# Patient Record
Sex: FEMALE | Race: BLACK OR AFRICAN AMERICAN | NOT HISPANIC OR LATINO | Employment: FULL TIME | URBAN - METROPOLITAN AREA
[De-identification: names, ages, dates, MRNs, and addresses within clinical notes are randomized per-mention and may not be internally consistent; named-entity substitution may affect disease eponyms.]

---

## 2017-02-21 ENCOUNTER — TELEPHONE (OUTPATIENT)
Dept: OBGYN | Facility: CLINIC | Age: 22
End: 2017-02-21

## 2017-02-22 NOTE — TELEPHONE ENCOUNTER
Patient wants to scheduled her new prenatal visit. She is 16 weeks and is hoping she doesn't have to do any ob education/intake like some of our other clinics. She wants to get in as soon as possible.    Marita URENA  Central Scheduler

## 2017-03-02 ENCOUNTER — PRENATAL OFFICE VISIT (OUTPATIENT)
Dept: OBGYN | Facility: CLINIC | Age: 22
End: 2017-03-02
Payer: COMMERCIAL

## 2017-03-02 VITALS
WEIGHT: 231 LBS | DIASTOLIC BLOOD PRESSURE: 52 MMHG | SYSTOLIC BLOOD PRESSURE: 98 MMHG | HEIGHT: 66 IN | BODY MASS INDEX: 37.12 KG/M2

## 2017-03-02 DIAGNOSIS — F32.A DEPRESSION AFFECTING PREGNANCY, ANTEPARTUM: ICD-10-CM

## 2017-03-02 DIAGNOSIS — Z34.82 ENCOUNTER FOR SUPERVISION OF OTHER NORMAL PREGNANCY IN SECOND TRIMESTER: Primary | ICD-10-CM

## 2017-03-02 DIAGNOSIS — O99.340 DEPRESSION AFFECTING PREGNANCY, ANTEPARTUM: ICD-10-CM

## 2017-03-02 DIAGNOSIS — O20.8 SUBCHORIONIC HEMORRHAGE IN FIRST TRIMESTER: ICD-10-CM

## 2017-03-02 PROBLEM — Z34.80 SUPERVISION OF NORMAL IUP (INTRAUTERINE PREGNANCY) IN MULTIGRAVIDA: Status: ACTIVE | Noted: 2017-03-02

## 2017-03-02 LAB
ABO + RH BLD: NORMAL
ABO + RH BLD: NORMAL
ALBUMIN UR-MCNC: NEGATIVE MG/DL
APPEARANCE UR: CLEAR
BILIRUB UR QL STRIP: NEGATIVE
BLD GP AB SCN SERPL QL: NORMAL
BLOOD BANK CMNT PATIENT-IMP: NORMAL
COLOR UR AUTO: YELLOW
GLUCOSE UR STRIP-MCNC: NEGATIVE MG/DL
HGB UR QL STRIP: NEGATIVE
KETONES UR STRIP-MCNC: ABNORMAL MG/DL
LEUKOCYTE ESTERASE UR QL STRIP: NEGATIVE
MUCOUS THREADS #/AREA URNS LPF: PRESENT /LPF
NITRATE UR QL: NEGATIVE
NON-SQ EPI CELLS #/AREA URNS LPF: ABNORMAL /LPF
PH UR STRIP: 7.5 PH (ref 5–7)
RBC #/AREA URNS AUTO: ABNORMAL /HPF (ref 0–2)
SP GR UR STRIP: 1.02 (ref 1–1.03)
SPECIMEN EXP DATE BLD: NORMAL
URN SPEC COLLECT METH UR: ABNORMAL
UROBILINOGEN UR STRIP-ACNC: 0.2 EU/DL (ref 0.2–1)
WBC #/AREA URNS AUTO: ABNORMAL /HPF (ref 0–2)

## 2017-03-02 PROCEDURE — 87086 URINE CULTURE/COLONY COUNT: CPT | Performed by: OBSTETRICS & GYNECOLOGY

## 2017-03-02 PROCEDURE — 86850 RBC ANTIBODY SCREEN: CPT | Performed by: OBSTETRICS & GYNECOLOGY

## 2017-03-02 PROCEDURE — 81001 URINALYSIS AUTO W/SCOPE: CPT | Performed by: OBSTETRICS & GYNECOLOGY

## 2017-03-02 PROCEDURE — 87389 HIV-1 AG W/HIV-1&-2 AB AG IA: CPT | Performed by: OBSTETRICS & GYNECOLOGY

## 2017-03-02 PROCEDURE — 86762 RUBELLA ANTIBODY: CPT | Performed by: OBSTETRICS & GYNECOLOGY

## 2017-03-02 PROCEDURE — 87340 HEPATITIS B SURFACE AG IA: CPT | Performed by: OBSTETRICS & GYNECOLOGY

## 2017-03-02 PROCEDURE — 99214 OFFICE O/P EST MOD 30 MIN: CPT | Performed by: OBSTETRICS & GYNECOLOGY

## 2017-03-02 PROCEDURE — 86780 TREPONEMA PALLIDUM: CPT | Performed by: OBSTETRICS & GYNECOLOGY

## 2017-03-02 PROCEDURE — 86901 BLOOD TYPING SEROLOGIC RH(D): CPT | Performed by: OBSTETRICS & GYNECOLOGY

## 2017-03-02 PROCEDURE — 36415 COLL VENOUS BLD VENIPUNCTURE: CPT | Performed by: OBSTETRICS & GYNECOLOGY

## 2017-03-02 PROCEDURE — 87491 CHLMYD TRACH DNA AMP PROBE: CPT | Performed by: OBSTETRICS & GYNECOLOGY

## 2017-03-02 PROCEDURE — 87591 N.GONORRHOEAE DNA AMP PROB: CPT | Performed by: OBSTETRICS & GYNECOLOGY

## 2017-03-02 PROCEDURE — 86900 BLOOD TYPING SEROLOGIC ABO: CPT | Performed by: OBSTETRICS & GYNECOLOGY

## 2017-03-02 PROCEDURE — G0145 SCR C/V CYTO,THINLAYER,RESCR: HCPCS | Performed by: OBSTETRICS & GYNECOLOGY

## 2017-03-02 ASSESSMENT — ANXIETY QUESTIONNAIRES
3. WORRYING TOO MUCH ABOUT DIFFERENT THINGS: NEARLY EVERY DAY
GAD7 TOTAL SCORE: 12
5. BEING SO RESTLESS THAT IT IS HARD TO SIT STILL: NOT AT ALL
1. FEELING NERVOUS, ANXIOUS, OR ON EDGE: SEVERAL DAYS
6. BECOMING EASILY ANNOYED OR IRRITABLE: NEARLY EVERY DAY
IF YOU CHECKED OFF ANY PROBLEMS ON THIS QUESTIONNAIRE, HOW DIFFICULT HAVE THESE PROBLEMS MADE IT FOR YOU TO DO YOUR WORK, TAKE CARE OF THINGS AT HOME, OR GET ALONG WITH OTHER PEOPLE: SOMEWHAT DIFFICULT
2. NOT BEING ABLE TO STOP OR CONTROL WORRYING: NEARLY EVERY DAY
7. FEELING AFRAID AS IF SOMETHING AWFUL MIGHT HAPPEN: NOT AT ALL

## 2017-03-02 ASSESSMENT — PATIENT HEALTH QUESTIONNAIRE - PHQ9: 5. POOR APPETITE OR OVEREATING: MORE THAN HALF THE DAYS

## 2017-03-02 NOTE — PROGRESS NOTES
This is a 21 year old female patient,   who presents for her first obstetrical visit.    No LMP recorded (lmp unknown). Patient is pregnant. Her cycles are irregular.  Her last menstrual period was normal.    She has had an ultrasound on 2016 which showed viable IUP measuring 8w4d.  Based off that ultrasound today she is 17w5d pregnant. This gives her an EDC of Aug 5, 2017 .    Since her LMP, she has experienced  nausea, emesis, abdominal pain, fatigue, headache and loss of appetite, urinary incontience, depression and spots in eyes.).  She denies vaginal discharge, dysuria, pelvic pain, urinary urgency, lightheadedness, urinary frequency, vaginal bleeding, hemorrhoids and constipation.    Pregnancy unplanned. Hx mirena IUD placement  after her EAB. Did have f/u to verify placement per pt. Has never had regular periods. When had initial US for early pregnancy, no IUD seen. Did have abd XR which did not show IUD.  Was just  in Nov.  is masters student at the . Will be graduating soon. She has family in area, mom helps watch kids while she is at work. 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   First two kids are from different father    Has hx depression/anxiety, no meds. Did have PP depression, worked with therapist, Dr. Franklin, at the  after her second child. Has been meaning to go back, but doesn't have much time to go, issues with  when not working.    17 US at 15+ weeks, wnl, resolved NATALIE.     Past History:  Her past medical history   Past Medical History   Diagnosis Date     Chlamydia 2014     Chronic kidney disease      kidney stone      Depressive disorder      during previous pregnancy/ situational, postpartum it was diagnosed with second baby.     Gonorrhea  2014   .      Her past pregnancies have been uncomplicated.    Since her last LMP she admits to the use of alcohol but none since she found out is pregnant.    HISTORY:  Obstetric History       T2       TAB0   SAB0   E0   M0   L1       # Outcome Date GA Lbr Fredrick/2nd Weight Sex Delivery Anes PTL Lv   4 Current            3 AB 2016 9w0d          2 Term 12/15/15 40w1d    Vag-Spont      1 Term 14 39w6d 05:15 / 02:27 8 lb 4 oz (3.742 kg) M Vag-Spont EPI N Y      Name: TYRONE LONG ALBERTINA      Apgar1:  9                Apgar5: 9        Past Medical History   Diagnosis Date     Chlamydia 2014     Chronic kidney disease      kidney stone      Depressive disorder      during previous pregnancy/ situational, postpartum it was diagnosed with second baby.     Gonorrhea  2014     Past Surgical History   Procedure Laterality Date     No history of surgery       Family History   Problem Relation Age of Onset     DIABETES Maternal Grandmother      Type II doabetis     Social History     Social History     Marital status: Single     Spouse name: N/A     Number of children: 1     Years of education: N/A     Occupational History      Student     Social History Main Topics     Smoking status: Never Smoker     Smokeless tobacco: Never Used     Alcohol use Yes     Drug use: No      Comment: Pt. had smoked marijuna occassionally in the past, stopped when she found out she was pregnant     Sexual activity: No      Comment: Pt. is currently pregnant     Other Topics Concern     None     Social History Narrative     Current Outpatient Prescriptions   Medication Sig     IBUPROFEN PO      Acetaminophen (TYLENOL PO)      No current facility-administered medications for this visit.      Allergies   Allergen Reactions     Banana      anaphyaxis       Seasonal Allergies        Past medical, surgical, social and family history were reviewed and updated in EPIC.    ROS:   12 point review of systems negative other than symptoms noted below.  Constitutional: Fatigue and Loss of Appetite  Eyes: Spots  Gastrointestinal: Abdominal Pain, Nausea and Vomiting  Genitourinary: Incontinence  Neurologic: Headaches  Psychiatric:  "Depression    EXAM:  BP 98/52 (BP Location: Right arm, Patient Position: Chair, Cuff Size: Adult Regular)  Ht 5' 5.6\" (1.666 m)  Wt 231 lb (104.8 kg)  LMP  (LMP Unknown)  BMI 37.74 kg/m2   BMI: Body mass index is 37.74 kg/(m^2).    EXAM:  Constitutional:  Appearance: Well nourished, well developed alert, in no acute distress.  Neck:   Lymph Nodes:  No lymphadenopathy present.    Thyroid:  Gland size normal, nontender, no nodules or masses present  on palpation.  Chest:  Respiratory Effort:  Breathing unlabored.  Cardiovascular:  Heart Auscultation:  Regular rate, normal rhythm, no murmurs    present.  Breasts: Deferred.    Axillary Lymph Nodes:  No lymphadenopathy present.  Gastrointestinal:  Abdominal Examination:  Abdomen nontender to palpation, tone  normal without rigidity or guarding, no masses present, umbilicus without  Lesions.    Liver and speen:  No hepatomegaly present, liver nontender to  palpation.    Hernias:  No hernias present.  Lymphatic: Lymph Nodes:  No other lymphadenopathy present.  Skin:  General Inspection:  No rashes present, no lesions present, no areas of  discoloration.    Genitalia and Groin:  No rashes present, no lesions present, no areas of  discoloration, no masses present.  Neurologic/Psychiatric:    Mental Status:  Oriented X3.    Pelvic Exam:  External Genitalia:     Normal appearance for age, no discharge present, no tenderness present, no inflammatory lesions present, color normal  Vagina:     Normal vaginal vault without central or paravaginal defects, no discharge present, no inflammatory lesions present, no masses present  Bladder:     Nontender to palpation  Urethra:   Urethral Body:  Urethra palpation normal, urethra structural support normal   Urethral Meatus:  No erythema or lesions present  Cervix:     Appearance healthy, no lesions present, nontender to palpation, no bleeding present  Uterus:     Nontender to palpation, no masses present, position anteflexed, mobility: " normal  Adnexa:     No adnexal tenderness present, no adnexal masses present  Perineum:     Perineum within normal limits, no evidence of trauma, no rashes or skin lesions present  Anus:     Anus within normal limits, no hemorrhoids present  Inguinal Lymph Nodes:     No lymphadenopathy present  Pubic Hair:     Normal pubic hair distribution for age  Genitalia and Groin:     No rashes present, no lesions present, no areas of discoloration, no masses present    ASSESSMENT:      ICD-10-CM    1. Encounter for supervision of other normal pregnancy in second trimester Z34.82 ABO/Rh type and screen     Anti Treponema     Chlamydia trachomatis PCR     Hepatitis B surface antigen     HIV Antigen Antibody Combo     Neisseria gonorrhoeae PCR     Rubella Antibody IgG Quantitative     UA with Microscopic     Urine Culture Aerobic Bacterial     Pap imaged thin layer screen only - recommended age 21 - 24 years     US OB > 14 Weeks Complete Single     Social Work IP Consult   2. Depression affecting pregnancy, antepartum O99.340 ABO/Rh type and screen    F32.9 Anti Treponema     Chlamydia trachomatis PCR     Hepatitis B surface antigen     HIV Antigen Antibody Combo     Neisseria gonorrhoeae PCR     Rubella Antibody IgG Quantitative     UA with Microscopic     Urine Culture Aerobic Bacterial     Pap imaged thin layer screen only - recommended age 21 - 24 years     US OB > 14 Weeks Complete Single     Social Work IP Consult       PLAN/PATIENT INSTRUCTIONS:      -Pap obtained for cervical cancer screening.  -NOB labs today, orders reviewed  -Declined aneuploidy screening. Discussed options for diagnostic and screening tests, benefits and limitations of each.   -Previous IUD 4/16 found out subsequently she was pregnant, unable to see IUD on any US thus far. No issues otherwise. Suspect fell out. Will consider pelvic Xray PP to verify it is not intraabdominal.  -Depression/anxiety. Has a therapist she can see, strongly recommended she  make an appt. Declined services through [facility]. No active plans for suicide.   -Start PNV  -Will get SW consult.   -miscarriage precautions reviewed. Return for anatomy US 2wk.         Amaya Mauro Masters, DO          HPI      ROS      Physical Exam

## 2017-03-02 NOTE — MR AVS SNAPSHOT
After Visit Summary   3/2/2017    Jazlyn Ann    MRN: [id number]           Patient Information     Date Of Birth          1995        Visit Information        Provider Department      3/2/2017 1:30 PM Masters, Amaya Mauro, DO; [facility]        Today's Diagnoses     Encounter for supervision of other normal pregnancy in second trimester    -  1    Depression affecting pregnancy, antepartum        Subchorionic hemorrhage in first trimester           Follow-ups after your visit        Additional Services     Social Work IP Consult                 Follow-up notes from your care team     Return in about 2 weeks (around 3/16/2017).      Your next 10 appointments already scheduled     Mar 16, 2017  9:00 AM CDT   US PELVIC COMPLETE W TRANSVAGINAL with WEUS1   [facility] ([facility])    [address] [place] [phone]           Please bring a list of your medicines (including vitamins, minerals and over-the-counter drugs). Also, tell your doctor about any allergies you may have. Wear comfortable clothes and leave your valuables at home.  Adults: Drink six 8-ounce glasses of fluid one hour before your exam. Do NOT empty your bladder.  If you need to empty your bladder before your exam, try to release only a little bit of urine. Then, drink another 8oz glass of fluid.  Children: Children who are potty trained should drink at least 4 cups (32 oz) of liquid 45 minutes to one hour prior to the exam. The child s bladder must be full in order to achieve a diagnostic exam. If your child is very uncomfortable or has an urgent need to pee, please notify a technologist; they will try to find out how much longer the wait may be and provide instructions to help relieve the pressure. Occasionally it is medically necessary to insert a urinary catheter to fill the bladder.  Please call the Imaging  "Department at your exam site with any questions.            Mar 16, 2017  9:40 AM CDT   ESTABLISHED PRENATAL with Amaya Mauro Masters, DO   [facility] ([facility])    [address] [place] [address] [phone]              Who to contact     If you have questions or need follow up information about today's clinic visit or your schedule please contact [facility] directly at [phone].  Normal or non-critical lab and imaging results will be communicated to you by [employer], letter or phone within 4 business days after the clinic has received the results. If you do not hear from us within 7 days, please contact the clinic through [employer] or phone. If you have a critical or abnormal lab result, we will notify you by phone as soon as possible.  Submit refill requests through [employer] or call your pharmacy and they will forward the refill request to us. Please allow 3 business days for your refill to be completed.          Additional Information About Your Visit        [employer] Information     [employer] lets you send messages to your doctor, view your test results, renew your prescriptions, schedule appointments and more. To sign up, go to [email or web address] . Click on \"Log in\" on the left side of the screen, which will take you to the Welcome page. Then click on \"Sign up Now\" on the right side of the page.     You will be asked to enter the access code listed below, as well as some personal information. Please follow the directions to create your username and password.     Your access code is: [email or web address]  Expires: 3/28/2017  8:32 AM     Your access code will  in 90 days. If you need help or a new code, please call your [facility] clinic or [phone].        Care EveryWhere ID     This is your Care EveryWhere ID. This could be used by other organizations to access your [facility] medical records  [id number]        Your " "Vitals Were     Height Last Period BMI (Body Mass Index)             5' 5.6\" (1.666 m) (LMP Unknown) 37.74 kg/m2          Blood Pressure from Last 3 Encounters:   03/02/17 98/52   12/28/16 (!) 111/92   08/11/16 128/78    Weight from Last 3 Encounters:   03/02/17 231 lb (104.8 kg)   12/28/16 210 lb (95.3 kg)   08/11/16 230 lb (104.3 kg)              We Performed the Following     ABO/Rh type and screen     Anti Treponema     Chlamydia trachomatis PCR     Hepatitis B surface antigen     HIV Antigen Antibody Combo     Neisseria gonorrhoeae PCR     Pap imaged thin layer screen only - recommended age 21 - 24 years     Rubella Antibody IgG Quantitative     Social Work IP Consult     UA with Microscopic     Urine Culture Aerobic Bacterial        Primary Care Provider Office Phone # Fax #    [facility] [phone]       [address] [place] [id number]        Thank you!     Thank you for choosing [facility] [place]  for your care. Our goal is always to provide you with excellent care. Hearing back from our patients is one way we can continue to improve our services. Please take a few minutes to complete the written survey that you may receive in the mail after your visit with us. Thank you!             Your Updated Medication List - Protect others around you: Learn how to safely use, store and throw away your medicines at www.disposemymeds.org.          This list is accurate as of: 3/2/17 11:59 PM.  Always use your most recent med list.                   Brand Name Dispense Instructions for use    IBUPROFEN PO          TYLENOL PO            "

## 2017-03-02 NOTE — NURSING NOTE
[facility] Obstetrical Risk History    Patient presents for new OB labs and teaching.      1. Please indicate any condition you have or have had in the past:  Chlamydia, Depression  2. Do you smoke?  No  If yes, how many packs/day?   3. Do you drink alcoholic beverages? Yes-stopped when found out is pregnant  If yes, how often?  What type?   4. List any medications taken since your last period: Tylenol, Ibuprofen  5. List any recreational drugs (cocaine, marijuana, etc. used since your last period:None    6. List any chemical or radiation exposure that you've encountered: None  7. Are you on a restricted diet? No  If yes, please describe:  Do you have any Yazdanism objections to any form of treatment? No    GENETIC SCREENING    These questions apply to you, the baby's father, or anyone in either family with:    1. Patient's age 35 or greater at delivery? No  2. East Timorese, Equatorial Guinean, Mediterranean ancestry? No  3. Neural Tube Defect (Meningomyelocele, Spina Bifida, or Anencephaly)? No  4. Gnosticism, Kazakh [place] or history of Jamey-Sachs disease? No  5. Down's Syndrome?   No  6. Hemophilia or clotting disorder? No  7. Muscular Dystrophy? No  8. Cystic Fibrosis? No  9. Von's Chorea? No  10. Mental Retardation? No  11. 3 or more miscarriages or a stillborn? No  12. Other inherited disease or chromosomal disorder? No  13. Have you or the baby's father had a child born with a birth defect? No  14. Did you or the baby's father have a birth defect yourselves? No    Do you have any other concerns about birth defects? No      -History of irregular periods.  -Pt has not had a flu shot this season.   -Checked box in PHQ-9 and marked of thoughts that would be better off dead. Denies a plan of harming herself.  -Thinks she has had a PAP smear but not sure. Order placed.  -Dr. Restrepo stated to place Social Work order. Order placed.  -Discussed Innatal (Verifi) genetic screening. Pt stated she had genetic screening with  both of her children.  -Denies complications with previous pregnancies. Both vaginal deliveries.

## 2017-03-03 LAB
BACTERIA SPEC CULT: NORMAL
C TRACH DNA SPEC QL NAA+PROBE: NORMAL
HBV SURFACE AG SERPL QL IA: NONREACTIVE
HIV 1+2 AB+HIV1 P24 AG SERPL QL IA: NORMAL
Lab: NORMAL
MICRO REPORT STATUS: NORMAL
N GONORRHOEA DNA SPEC QL NAA+PROBE: NORMAL
RUBV IGG SERPL IA-ACNC: 35 IU/ML
SPECIMEN SOURCE: NORMAL
T PALLIDUM IGG+IGM SER QL: NEGATIVE

## 2017-03-03 ASSESSMENT — ANXIETY QUESTIONNAIRES: GAD7 TOTAL SCORE: 12

## 2017-03-03 ASSESSMENT — PATIENT HEALTH QUESTIONNAIRE - PHQ9: SUM OF ALL RESPONSES TO PHQ QUESTIONS 1-9: 22

## 2017-03-05 PROBLEM — O20.8 SUBCHORIONIC HEMORRHAGE IN FIRST TRIMESTER: Status: ACTIVE | Noted: 2017-03-05

## 2017-03-06 LAB
COPATH REPORT: NORMAL
PAP: NORMAL

## 2017-03-10 ENCOUNTER — TELEPHONE (OUTPATIENT)
Dept: NURSING | Facility: CLINIC | Age: 22
End: 2017-03-10

## 2017-03-10 NOTE — TELEPHONE ENCOUNTER
Pt 18w6d. Pt was talking to CMA about results and stated she was having constant headaches, task was routed to triage.   LMTCB to discuss symptoms.

## 2017-03-14 ENCOUNTER — CARE COORDINATION (OUTPATIENT)
Dept: CARE COORDINATION | Facility: CLINIC | Age: 22
End: 2017-03-14

## 2017-03-14 NOTE — PROGRESS NOTES
Clinic Care Coordination Contact  OUTREACH    Referral Information:  Referral Source:  (OB)  Reason for Contact: Jazlyn learned of her pregnancy in the ED; has had 1 pre-karina visit. Jazlyn went silent after I introduced myself, sounded like she hung up so I redialed. Went directly to voice mail. SW introduced role of [facility]_SW services and invited Jazlyn to call back if she would like support or community resources. Wished her well.  Care Conference: No     Universal Utilization:   ED Visits in last year: 2  Hospital visits in last year: 0  Last PCP appointment: 17  Missed Appointments:  (0)  Concerns:  (Unplanned pregnancy; screen for depression)  Multiple Providers or Specialists: 1    Clinical Concerns:  Current Medical Concerns: Unplanned pregnancy    Current Behavioral Concerns: 0 (history of depression?)   Education Provided to patient: via voice mail      Clinical Pathway: None    Medication Management:  self     Functional Status:  Mobility Status: Independent  Equipment Currently Used at Home:  (none)  Transportation: unknown           Psychosocial:  Current living arrangement:: Not Assessed  Financial/Insurance: not discussed       Resources and Interventions:  Current Resources:  (na);  (na)  PAS Number:  (na)  Senior Linkage Line Referral Placed:  (na)  Advanced Care Plans/Directives on file:: No  Referrals Placed:  (na)     Frequency of Care Coordination:  (declined)  Upcoming appointment:  (none)     Plan: [facility]  Clinic Care Coordinator-  Clinics: [place], [place], [place]  E-Mail: [email or web address]  Telephone: [phone]

## 2017-03-16 ENCOUNTER — PRENATAL OFFICE VISIT (OUTPATIENT)
Dept: OBGYN | Facility: CLINIC | Age: 22
End: 2017-03-16
Attending: OBSTETRICS & GYNECOLOGY
Payer: COMMERCIAL

## 2017-03-16 ENCOUNTER — RADIANT APPOINTMENT (OUTPATIENT)
Dept: ULTRASOUND IMAGING | Facility: CLINIC | Age: 22
End: 2017-03-16
Attending: OBSTETRICS & GYNECOLOGY
Payer: COMMERCIAL

## 2017-03-16 VITALS — DIASTOLIC BLOOD PRESSURE: 70 MMHG | WEIGHT: 242 LBS | BODY MASS INDEX: 39.54 KG/M2 | SYSTOLIC BLOOD PRESSURE: 114 MMHG

## 2017-03-16 DIAGNOSIS — O99.340 DEPRESSION AFFECTING PREGNANCY, ANTEPARTUM: ICD-10-CM

## 2017-03-16 DIAGNOSIS — O99.340 DEPRESSION AFFECTING PREGNANCY, ANTEPARTUM: Primary | ICD-10-CM

## 2017-03-16 DIAGNOSIS — Z34.82 ENCOUNTER FOR SUPERVISION OF OTHER NORMAL PREGNANCY IN SECOND TRIMESTER: ICD-10-CM

## 2017-03-16 DIAGNOSIS — F32.A DEPRESSION AFFECTING PREGNANCY, ANTEPARTUM: Primary | ICD-10-CM

## 2017-03-16 DIAGNOSIS — Z34.82 SUPERVISION OF NORMAL IUP (INTRAUTERINE PREGNANCY) IN MULTIGRAVIDA, SECOND TRIMESTER: ICD-10-CM

## 2017-03-16 DIAGNOSIS — F32.A DEPRESSION AFFECTING PREGNANCY, ANTEPARTUM: ICD-10-CM

## 2017-03-16 PROCEDURE — 76805 OB US >/= 14 WKS SNGL FETUS: CPT | Performed by: OBSTETRICS & GYNECOLOGY

## 2017-03-16 PROCEDURE — 99212 OFFICE O/P EST SF 10 MIN: CPT | Performed by: OBSTETRICS & GYNECOLOGY

## 2017-03-16 NOTE — MR AVS SNAPSHOT
After Visit Summary   3/16/2017    Jazlyn Ann    MRN: [id number]           Patient Information     Date Of Birth          1995        Visit Information        Provider Department      3/16/2017 9:40 AM s, Amaya Mauro,  [facility]        Today's Diagnoses     Depression affecting pregnancy, antepartum    -  1    Supervision of normal IUP (intrauterine pregnancy) in multigravida, second trimester           Follow-ups after your visit        Additional Services     MENTAL HEALTH REFERRAL       Your provider has referred you to: Behavioral Healthcare Providers Intake Scheduling [phone]   [email or web address]    All scheduling is subject to the client's specific insurance plan & benefits, provider/location availability, and provider clinical specialities.  Please arrive 15 minutes early for your first appointment and bring your completed paperwork.    Please be aware that coverage of these services is subject to the terms and limitations of your health insurance plan.  Call member services at your health plan with any benefit or coverage questions.                  Follow-up notes from your care team     Return in about 2 weeks (around 3/30/2017).      Future tests that were ordered for you today     Open Future Orders        Priority Expected Expires Ordered    US OB Limited One Or More Fetuses Routine  3/17/2018 3/16/2017            Who to contact     If you have questions or need follow up information about today's clinic visit or your schedule please contact [facility] directly at [phone].  Normal or non-critical lab and imaging results will be communicated to you by MyChart, letter or phone within 4 business days after the clinic has received the results. If you do not hear from us within 7 days, please contact the clinic through MyChart or phone. If you have a critical or abnormal lab result, we will notify you by phone as soon  "as possible.  Submit refill requests through [employer] or call your pharmacy and they will forward the refill request to us. Please allow 3 business days for your refill to be completed.          Additional Information About Your Visit        [employer] Information     [employer] lets you send messages to your doctor, view your test results, renew your prescriptions, schedule appointments and more. To sign up, go to [email or web address] . Click on \"Log in\" on the left side of the screen, which will take you to the Welcome page. Then click on \"Sign up Now\" on the right side of the page.     You will be asked to enter the access code listed below, as well as some personal information. Please follow the directions to create your username and password.     Your access code is: [email or web address]  Expires: 3/28/2017  9:32 AM     Your access code will  in 90 days. If you need help or a new code, please call your [facility] clinic or [phone].        Care EveryWhere ID     This is your Care EveryWhere ID. This could be used by other organizations to access your [facility] medical records  [id number]        Your Vitals Were     Last Period BMI (Body Mass Index)                (LMP Unknown) 39.54 kg/m2           Blood Pressure from Last 3 Encounters:   17 114/70   17 98/52   16 (!) 111/92    Weight from Last 3 Encounters:   17 242 lb (109.8 kg)   17 231 lb (104.8 kg)   16 210 lb (95.3 kg)              We Performed the Following     MENTAL HEALTH REFERRAL        Primary Care Provider Office Phone # Fax #    [facility] [phone]       [address] [place] [id number]        Thank you!     Thank you for choosing [facility]  for your care. Our goal is always to provide you with excellent care. Hearing back from our patients is one way we can continue to improve our services. Please take a few minutes to complete the written survey that " you may receive in the mail after your visit with us. Thank you!             Your Updated Medication List - Protect others around you: Learn how to safely use, store and throw away your medicines at www.disposemymeds.org.          This list is accurate as of: 3/16/17 10:10 AM.  Always use your most recent med list.                   Brand Name Dispense Instructions for use    IBUPROFEN PO      Reported on 3/16/2017       TYLENOL PO

## 2017-03-16 NOTE — PROGRESS NOTES
No loss of fluid/vaginal bleeding/regular contractions. +FM  Wondering about her wt gain.   -Discussed nutrition and exercise, limiting wt gain.   -Reviewed anatomy US. KATHRYN 9.4, however, two pockets >2cm. Will repeat KATHRYN in 1-2wk  -Has not f/u with mental health yet. Plans to call to make appt.   - Labor precautions. F/U 2wk.     Amaya Mauro Masters, DO

## 2017-04-18 ENCOUNTER — CARE COORDINATION (OUTPATIENT)
Dept: CARE COORDINATION | Facility: CLINIC | Age: 22
End: 2017-04-18

## 2017-04-18 NOTE — LETTER
Health Care Home - Access Care Plan    About Me  Patient Name:  Jazlyn Ann    YOB: 1995  Age:                            21 year old   Kina MRN:         [id number] Telephone Information:     Home Phone [phone]   Mobile [phone]       Address:    [address] [place] [address] Email address:  No e-mail address on record      Emergency Contact(s)  Name Relationship Lgl Grd Work Phone Home Phone Mobile Phone   1. ANA MARIA MCHUGH* Mother  none [phone]   2. NO SEC CONTACT Other   [phone]              Health Maintenance: Routine Health maintenance Reviewed: Due/Overdue     My Access Plan  Medical Emergency 911   Questions or concerns during clinic hours Primary Clinic Line, I will call the clinic directly: Primary Clinic:  (uses several)   24 Hour Appointment Line [phone] or  [phone])  (toll free)   24 Hour Nurse Line [phone] (toll free)   Questions or concerns outside clinic hours 24 Hour Appointment Line, I will call the after-hours on-call line:   [facility] [phone] or [phone]) (toll-free)   Preferred Urgent Care Preferred Urgent Care: Other   Preferred Hospital Preferred Hospital:  ([facility])   Preferred Pharmacy [facility] [facility] [place], [place] - [address] Behavioral Health Crisis Line Crisis Connection, [phone] or [phone]     My Care Team Members  Patient Care Team       Relationship Specialty Notifications Start End    Clinic, [facility] PCP - General   12/10/15     Phone: [phone] Fax: [phone]         [address] [place] [address]    Ting Davies LSW  Clinic Admissions 4/18/17     Comment:  Care Coordination [facility]    Phone: [phone] Fax: [phone]            Suzy Franklin PsyD., LP Psychologist COUNSELOR - PROFESSIONAL  4/18/17     Comment:  [facility]    Phone: [phone]         [address]; [place] [address]    [facility] Obstetrician Obstetrics & Gynecology, Maternal & Fetal Medicine  4/18/17     Phone: [phone] [address] [place] [address]        My Medical and Care Information  Problem List   Patient Active Problem List   Diagnosis     Fall     Supervision of normal IUP (intrauterine pregnancy) in multigravida     Subchorionic hemorrhage in first trimester      Current Medications and Allergies:  See printed Medication Report

## 2017-04-18 NOTE — PROGRESS NOTES
Clinic Care Coordination Contact  OUTREACH    Referral Information:  Referral Source:  (OB)  Reason for Contact: Jazlyn is needing to schedule a counseling appt for untreated depression/risk of PPD.  Care Conference: No     Universal Utilization:   ED Visits in last year: 2  Hospital visits in last year: 0  Last PCP appointment: 03/16/17  Missed Appointments:  (0)  Concerns:  (Unplanned pregnancy; screen for depression)  Multiple Providers or Specialists: 1    Clinical Concerns:  Current Medical Concerns: 6 months pregnant     Current Behavioral Concerns: 0    Education Provided to patient: Role of CC SW   Clinical Pathway Name: None  Clinical Pathway: None    Medication Management:  self     Functional Status:  Mobility Status: Independent  Equipment Currently Used at Home:  (none)  Transportation: self           Psychosocial:  Current living arrangement:: I live in a private home with spouse and children. My 's mother is coming from [place] to be present for birth and stay to help at home. My mom lives local, is involved.  Financial/Insurance: Medicjyoti GLASER at this time       Resources and Interventions:  Jazlyn agrees it would be helpful to have monthly support call from CC    Frequency of Care Coordination:  (As needed)  Upcoming appointment:  (none) Jazlyn stated she will call Suzy Franklin to schedule a counseling appt.     Plan: SW out reach 1 time per month through this pregnancy.    Mily Davies [facility]  Clinic Care Coordinator-  Clinics: [place], [place], [place]  E-Mail: [email or web address]  Telephone: [phone]

## 2017-04-19 NOTE — PROGRESS NOTES
The patient has not been seen for obstetrical care in one month, repeat evaluation was recommended for 1-2 weeks from the last. Has no f/u visits scheduled.   Please encourage pt to make an appointment for ultrasound and f/u visit.    Amaya Oconnors, DO

## 2017-04-26 ENCOUNTER — CARE COORDINATION (OUTPATIENT)
Dept: CARE COORDINATION | Facility: CLINIC | Age: 22
End: 2017-04-26

## 2017-04-26 NOTE — PROGRESS NOTES
Clinic Care Coordination Contact  OUTREACH    Referral Information:  Referral Source:  (OB)  Reason for Contact: Follow up per OB: Pt needs OB appt and a follow up US  Care Conference: No     Universal Utilization:   ED Visits in last year: 2  Hospital visits in last year: 0  Last PCP appointment: 03/16/17  Missed Appointments:  (0)  Concerns:  (Unplanned pregnancy; screen for depression)  Multiple Providers or Specialists: 1    Clinical Concerns:  Current Medical Concerns: Pregnancy with questionable US results    Current Behavioral Concerns: 0    Education Provided to patient: Importance of her appts   Clinical Pathway Name: None  Clinical Pathway: None    Medication Management:  self     Functional Status:  Mobility Status: Independent  Equipment Currently Used at Home:  (none)  Transportation: self           Psychosocial:  Current living arrangement:: I live in a private home with spouse  Financial/Insurance: has a new job; 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       Resources and Interventions:  Current Resources:  (na);  (na)  PAS Number:  (na)  Senior Linkage Line Referral Placed:  (na)  Advanced Care Plans/Directives on file:: No  Referrals Placed:  (na)     Goals:   Goal 1 Statement:  (I will call scheduling for OB appt and US follow up)  Goal 1 Progression Percent: 20%  Goal 1 Progression Date: 04/26/17    Barriers: tired chasing after 2 young sons and now working 2nd to 3rd shifts  Strengths: sounded engaged in conversation today  Patient/Caregiver understanding: good, could state OB told her there are 2 fluid areas on 1st US they would like to have checked out.  Frequency of Care Coordination:  (As needed)  Upcoming appointment:  Jazlyn has committed to caling scheduling line as soon as she finished our conversation today.     Plan: SW following for progress on goals/offer support.    Mily Davies [facility]  Clinic Care Coordinator-  Clinics: [place], [place], [place]  E-Mail:  [email or web address]  Telephone: [phone]

## 2017-04-28 ENCOUNTER — RADIANT APPOINTMENT (OUTPATIENT)
Dept: ULTRASOUND IMAGING | Facility: CLINIC | Age: 22
End: 2017-04-28
Attending: OBSTETRICS & GYNECOLOGY
Payer: COMMERCIAL

## 2017-04-28 DIAGNOSIS — Z34.82 SUPERVISION OF NORMAL IUP (INTRAUTERINE PREGNANCY) IN MULTIGRAVIDA, SECOND TRIMESTER: ICD-10-CM

## 2017-04-28 PROCEDURE — 76815 OB US LIMITED FETUS(S): CPT | Performed by: OBSTETRICS & GYNECOLOGY

## 2017-05-30 ENCOUNTER — CARE COORDINATION (OUTPATIENT)
Dept: CARE COORDINATION | Facility: CLINIC | Age: 22
End: 2017-05-30

## 2017-05-30 NOTE — PROGRESS NOTES
Clinic Care Coordination Contact  [facility]/Voicemail    Referral Source:  (OB)  Clinical Data: Care Coordinator Outreach  Outreach attempted x 2.  Left message on voicemail with call back information and requested return call.  Plan: Care Coordinator is following to encourage Jazlyn to schedule OB appts. SW needs to verify if [facility] is primary or if [facility] is.. Care Coordinator will try to reach patient again in 8-10 business days.    Mily Davies [facility]  Clinic Care Coordinator-  Clinics: [place], [facility], [place]  E-Mail: [email or web address]  Telephone: [phone]

## 2017-06-19 ENCOUNTER — CARE COORDINATION (OUTPATIENT)
Dept: CARE COORDINATION | Facility: CLINIC | Age: 22
End: 2017-06-19

## 2017-06-19 NOTE — LETTER
Health Care Home - Access Care Plan    About Me  Patient Name:  Jazlyn Ann    YOB: 1995  Age:                            21 year old   Kina MRN:         [id number] Telephone Information:     Home Phone [phone]   Mobile [phone]       Address:    [address] [facility] [address]  [facility] [address] Email address:  No e-mail address on record      Emergency Contact(s)  Name Relationship Lgl Grd Work Phone Home Phone Mobile Phone   1. ANA MARIA MCHUGH* Mother  none [phone]   2. NO SEC CONTACT Other   [phone]              Health Maintenance: Routine Health maintenance Reviewed: Up to date    My Access Plan  Medical Emergency 911   Questions or concerns during clinic hours Primary Clinic Line, I will call the clinic directly: Primary Clinic:  (uses several)   24 Hour Appointment Line [phone] or  [phone])  (toll free)   24 Hour Nurse Line [phone] (toll free)   Questions or concerns outside clinic hours 24 Hour Appointment Line, I will call the after-hours on-call line:   [facility] [phone] or [phone]) (toll-free)   Preferred Urgent Care Preferred Urgent Care: Other   Preferred Hospital Preferred Hospital:  ([facility])   Preferred Pharmacy [facility] [place], [place] - [address] Behavioral Health Crisis Line Crisis Connection, [phone] or [phone]     My Care Team Members  Patient Care Team       Relationship Specialty Notifications Start End    [facility] PCP - General   12/10/15     Phone: [phone] Fax: [phone]         [address] [place] [address]    Ting Davies LSW  Clinic Admissions 4/18/17     Comment:  Care Coordination [facility]    Phone: [phone] Fax: [phone]         [place] [facility] [address] [place] [address]    Suzy Franklin PsyD. ADRIEL Psychologist COUNSELOR - PROFESSIONAL  4/18/17     Comment:  [facility]  [facility]    Phone: [phone]         [address] [place]; [place] [address]    [facility] Obstetrician Obstetrics & Gynecology, Maternal & Fetal Medicine  4/18/17     Phone: [phone] [address] [place] [address]    Masters, Amaya Mauro DO MD OB/Gyn  6/19/17     Phone: [phone] Fax: [phone]         [facility] [address] [place] [address]        My Medical and Care Information  Problem List   Patient Active Problem List   Diagnosis     Fall     Supervision of normal IUP (intrauterine pregnancy) in multigravida     Subchorionic hemorrhage in first trimester      Current Medications and Allergies:  See printed Medication Report

## 2017-06-19 NOTE — LETTER
[place] CARE COORDINATION  [facility] [address]  Phone: [phone]      June 19, 2017      Jazlyn Ann  [address]  [place] [address]    Dear Jazlyn,    It was nice talking to you today. Enclosed are the services [facility] offers that I reviewed in general on our call. This will give you more detailed information on our various services.    Schedule a Same-Day Appointment: Most [facility] offer same-day appointments and extended hours Monday-Friday. Call [facility] (toll-free), 24/7, to schedule an appointment.    We have Urgent Care: Choose [facility] when you need non-emergency care for a sore throat, ear infection, sinus infection, minor burn, minor cut, bladder/urinary tract infection (UTI), or other general illnesses and discomforts. Locations are [place] (Weekends only), [place], [place], [place], [place], [place] and [facility] and you can see the wait times by going online to [email or web address] under urgent care   [email or web address]    [facility] offers Express Care within the [facility]. Choose Express Care when you have a certain non-emergency illness or injury. Express Cares offer flu, strep and UTI tests (but don't have on-site laboratories or X-rays). Express Cares have set prices but also accept several major insurance plans. [email or web address]    Online - OnCare is an online diagnosis and treatment option for minor health conditions. You'll answer questions about your symptoms in a 5-minute online survey (no web camera needed). Then a [facility] provider will respond in less than an hour from 8 a.m. to 8 p.m., 7 days a week. Signing up is FREE and each visit only costs $25.   [email or web address]     Phone -can set up a phone visit with their primary care provider. Contact your clinic or schedule a visit through [facility] .    Email - Current  [facility] patients can use [facility]  messaging (similar to email) for diagnosis and treatment. [email or web address]     E-visits: through [facility]opo  email functionality, you share your medical concerns or symptoms. Your health-care provider will respond to you within 24 hours (except weekends) of receiving your message if it is a question that they feel can be managed without seeing you in person. Cost is $35 We will bill your insurance company for this service. You ll be responsible for the full cost if insurance coverage is denied.   [email or web address]   I hope you find this information useful - We value our patients at [facility] and want to help you be able to access care that is both convenient and affordable for you and your family.    Sincerely,        Ting Davies    ENC: Baby resources and 24 Hour Access Plan.

## 2017-06-19 NOTE — PROGRESS NOTES
Clinic Care Coordination Contact  OUTREACH    Referral Information:  Referral Source:  (OB)  Reason for Contact: Jazlyn returned SW call. She need Dr Oconnor's telephone number; also would appreciate SW mailing her some mother/baby resources.        Universal Utilization:   ED Visits in last year: 2  Hospital visits in last year: 0  Last PCP appointment: 03/16/17  Missed Appointments:  (0)  Concerns:  (Unplanned pregnancy; screen for depression)  Multiple Providers or Specialists: 1    Clinical Concerns:  Current Medical Concerns: expecting a bay in August 2017    Current Behavioral Concerns: 0    Education Provided to patient: role of CC   Clinical Pathway Name: None  Clinical Pathway: None    Medication Management:  self     Functional Status:  Mobility Status: Independent  Equipment Currently Used at Home:  (none)  Transportation: no concerns           Psychosocial:  Current living arrangement:: I live in a private home with spouse  Financial/Insurance: [place] Care product?       Resources and Interventions:  Current Resources:  (na);  (OB new baby resources)  PAS Number:  (na)  Senior Linkage Line Referral Placed:  (na)  Advanced Care Plans/Directives on file:: No  Referrals Placed:  (na)     Goals:    NA              Barriers: NA  Strengths: NA  Patient/Caregiver understanding: good  Frequency of Care Coordination:  (As needed)  Upcoming appointment:  (none)     Plan: SW out reach for support; SW to complete a full psychosocial when Jazlyn has time. AMAN will mail baby resources to Jazlyn at [address] [facility] [address].    Mily Davies [facility]  Clinic Care Coordinator-  Clinics: [place], [place], [place]  E-Mail: [email or web address]  Telephone: [phone]

## 2017-06-26 ENCOUNTER — PRENATAL OFFICE VISIT (OUTPATIENT)
Dept: OBGYN | Facility: CLINIC | Age: 22
End: 2017-06-26
Payer: MEDICAID

## 2017-06-26 VITALS — SYSTOLIC BLOOD PRESSURE: 104 MMHG | WEIGHT: 241 LBS | BODY MASS INDEX: 39.37 KG/M2 | DIASTOLIC BLOOD PRESSURE: 60 MMHG

## 2017-06-26 DIAGNOSIS — Z34.83 SUPERVISION OF NORMAL IUP (INTRAUTERINE PREGNANCY) IN MULTIGRAVIDA, THIRD TRIMESTER: ICD-10-CM

## 2017-06-26 PROCEDURE — 99207 ZZC PRENATAL VISIT: CPT | Performed by: OBSTETRICS & GYNECOLOGY

## 2017-06-26 RX ORDER — PRENATAL VIT/IRON FUM/FOLIC AC 27MG-0.8MG
1 TABLET ORAL DAILY
COMMUNITY
End: 2017-06-27

## 2017-06-26 NOTE — PROGRESS NOTES
No loss of fluid/vaginal bleeding/regular contractions. + FM  Just finally got insurance.  Has not been able to see therapist due to insurance and working.   Had gotten IUD in April right after an . Had gone back for IUD check and it was present. Is thinking about getting her tubes tied.Will consider further  Is thinking about only taking 3wks for maternity leave.   -Recent US showed normal KATHRYN and breech. Cephalic today.  -Still needs to do glucola, showed up today having eaten right before arrival. Informed her to return this week.   -Tubal papers signed. Discussed she needs to be certain, today she does express some hesitation. I discussed her increased risk of regret due to her young age. Discussed IUDs or Nexplanon, though she naturally is hesitant about IUD as conceived on one.   -Mood appropriate. No red flags. Encouraged her to make appt with psych as soon as she can.   -Not planning on hosp tour  -GIven infomration on baby box.  - Labor precautions. F/U 1wk    Amaya Mauro Masters, DO

## 2017-06-26 NOTE — MR AVS SNAPSHOT
After Visit Summary   6/26/2017    Jazlyn Ann    MRN: [id number]           Patient Information     Date Of Birth          1995        Visit Information        Provider Department      6/26/2017 1:50 PM s, Amaya Mauro DO [facility] [place]        Today's Diagnoses     Supervision of normal IUP (intrauterine pregnancy) in multigravida, third trimester           Follow-ups after your visit        Your next 10 appointments already scheduled     Jun 27, 2017  9:30 AM CDT   Glucose Tolerance Test with [facility]   [facility] [place] ([facility] [place])    [address]  [place] [address]   [phone]            Jul 03, 2017 10:30 AM CDT   ESTABLISHED PRENATAL with Amaya Restrepo,    [facility] [place] ([facility] [place])    [address]  [place] [address]   [phone]              Future tests that were ordered for you today     Open Future Orders        Priority Expected Expires Ordered    OB hemoglobin Routine  7/7/2017 6/26/2017    Glucose tolerance gest screen 1 hour Routine  7/7/2017 6/26/2017            Who to contact     If you have questions or need follow up information about today's clinic visit or your schedule please contact [facility] [place] directly at [phone].  Normal or non-critical lab and imaging results will be communicated to you by MyChart, letter or phone within 4 business days after the clinic has received the results. If you do not hear from us within 7 days, please contact the clinic through MyChart or phone. If you have a critical or abnormal lab result, we will notify you by phone as soon as possible.  Submit refill requests through [facility] or call your pharmacy and they will forward the refill request to us. Please allow 3 business days for your refill to be completed.          Additional Information About Your Visit       "  MyChart Information     [address] lets you send messages to your doctor, view your test results, renew your prescriptions, schedule appointments and more. To sign up, go to [email or web address] . Click on \"Log in\" on the left side of the screen, which will take you to the Welcome page. Then click on \"Sign up Now\" on the right side of the page.     You will be asked to enter the access code listed below, as well as some personal information. Please follow the directions to create your username and password.     Your access code is: [email or web address]  Expires: 2017  2:45 PM     Your access code will  in 90 days. If you need help or a new code, please call your [facility] clinic or [phone].        Care EveryWhere ID     This is your Care EveryWhere ID. This could be used by other organizations to access your [facility] medical records  [id number]        Your Vitals Were     Last Period BMI (Body Mass Index)                (LMP Unknown) 39.37 kg/m2           Blood Pressure from Last 3 Encounters:   17 104/60   17 114/70   17 98/52    Weight from Last 3 Encounters:   17 241 lb (109.3 kg)   17 242 lb (109.8 kg)   17 231 lb (104.8 kg)              Today, you had the following     No orders found for display       Primary Care Provider Office Phone # Fax #    [facility] [phone]       [address] [place] [address]        Equal Access to Services     LINDA SOSA : Hadii clyde robertson Sojorge luis, waaxda luqadaha, qaybta kaalsusie kaur. So [facility] [phone].    ATENCIÓN: Si habla español, tiene a ferrell disposición servicios gratuitos de asistencia lingüística. Llame al [phone].    We comply with applicable federal civil rights laws and [place] laws. We do not discriminate on the basis of race, color, national origin, age, disability sex, sexual orientation or gender identity.          "   Thank you!     Thank you for choosing [facility] [place]  for your care. Our goal is always to provide you with excellent care. Hearing back from our patients is one way we can continue to improve our services. Please take a few minutes to complete the written survey that you may receive in the mail after your visit with us. Thank you!             Your Updated Medication List - Protect others around you: Learn how to safely use, store and throw away your medicines at www.disposemymeds.org.          This list is accurate as of: 6/26/17  2:45 PM.  Always use your most recent med list.                   Brand Name Dispense Instructions for use Diagnosis    prenatal multivitamin  plus iron 27-0.8 MG Tabs per tablet      Take 1 tablet by mouth daily        TYLENOL PO

## 2017-06-27 ENCOUNTER — TELEPHONE (OUTPATIENT)
Dept: OBGYN | Facility: CLINIC | Age: 22
End: 2017-06-27

## 2017-06-27 DIAGNOSIS — Z34.83 SUPERVISION OF NORMAL IUP (INTRAUTERINE PREGNANCY) IN MULTIGRAVIDA, THIRD TRIMESTER: ICD-10-CM

## 2017-06-27 DIAGNOSIS — Z34.83 SUPERVISION OF NORMAL INTRAUTERINE PREGNANCY IN MULTIGRAVIDA, THIRD TRIMESTER: Primary | ICD-10-CM

## 2017-06-27 LAB
GLUCOSE 1H P 50 G GLC PO SERPL-MCNC: 77 MG/DL (ref 60–129)
HGB BLD-MCNC: 10.4 G/DL (ref 11.7–15.7)

## 2017-06-27 PROCEDURE — 82950 GLUCOSE TEST: CPT | Performed by: OBSTETRICS & GYNECOLOGY

## 2017-06-27 PROCEDURE — 00000218 ZZHCL STATISTIC OBHBG - HEMOGLOBIN: Performed by: OBSTETRICS & GYNECOLOGY

## 2017-06-27 PROCEDURE — 36415 COLL VENOUS BLD VENIPUNCTURE: CPT | Performed by: OBSTETRICS & GYNECOLOGY

## 2017-06-27 NOTE — TELEPHONE ENCOUNTER
Prenatal Vitamin      Last Written Prescription Date:  NA  Last Fill Quantity: NA,   # refills: NA  Last Office Visit with [facility] primary care provider:  6/26/17  Future Office visit: 7/3/2017    Pt saw Lauro yesterday and she has not been taking PNV at all during this pregnancy and has asked if Lauro could prescribe it. She was in today for her glucose tolerance testing    Routing refill request to provider for review/approval because:  Medication is reported/historical

## 2017-06-29 NOTE — TELEPHONE ENCOUNTER
34w5d; JAQUI: 8/5/17  Routing to Dr. Restrepo, ok to fill? Noted as Historical, never prescribed by our clinic. Rx & pharmacy pended.

## 2017-06-30 ENCOUNTER — CARE COORDINATION (OUTPATIENT)
Dept: CARE COORDINATION | Facility: CLINIC | Age: 22
End: 2017-06-30

## 2017-06-30 RX ORDER — PRENATAL VIT/IRON FUM/FOLIC AC 27MG-0.8MG
1 TABLET ORAL DAILY
Qty: 100 TABLET | Refills: 11 | Status: SHIPPED | OUTPATIENT
Start: 2017-06-30 | End: 2019-06-11

## 2017-06-30 NOTE — TELEPHONE ENCOUNTER
Prescription given. Also inform her that PNV are available, the same kind, over the counter and she does not need to delay use for prescription.    Amaya Mauro Masters, DO

## 2017-06-30 NOTE — PROGRESS NOTES
Jazlyn followed through and scheduled her next OB appt for July 3. 2017.  Updated her goals.    Mily Davies [facility]  Clinic Care Coordinator-  Clinics: [place], [place], [place]  E-Mail: [email or web address]  Telephone: [phone]   Repeat CT head @24hr post-tPA shows no interval change, no hydrocephalus, midline shift, acute intracranial hemorrhage or demarcated territorial infarct. Pending MRI.

## 2017-07-10 ENCOUNTER — PRENATAL OFFICE VISIT (OUTPATIENT)
Dept: OBGYN | Facility: CLINIC | Age: 22
End: 2017-07-10
Payer: MEDICAID

## 2017-07-10 VITALS — WEIGHT: 245.8 LBS | SYSTOLIC BLOOD PRESSURE: 108 MMHG | BODY MASS INDEX: 40.16 KG/M2 | DIASTOLIC BLOOD PRESSURE: 66 MMHG

## 2017-07-10 DIAGNOSIS — Z36.9 ENCOUNTER FOR ANTENATAL SCREENING OF MOTHER: Primary | ICD-10-CM

## 2017-07-10 DIAGNOSIS — Z34.83 SUPERVISION OF NORMAL IUP (INTRAUTERINE PREGNANCY) IN MULTIGRAVIDA, THIRD TRIMESTER: ICD-10-CM

## 2017-07-10 PROCEDURE — 87186 SC STD MICRODIL/AGAR DIL: CPT | Performed by: OBSTETRICS & GYNECOLOGY

## 2017-07-10 PROCEDURE — 87653 STREP B DNA AMP PROBE: CPT | Performed by: OBSTETRICS & GYNECOLOGY

## 2017-07-10 PROCEDURE — 99207 ZZC PRENATAL VISIT: CPT | Performed by: OBSTETRICS & GYNECOLOGY

## 2017-07-10 NOTE — PROGRESS NOTES
No loss of fluid/vaginal bleeding/regular contractions. + FM  -brought in certificate for baby box.   -GBS testing today  - Labor precautions. F/U 1wk    Amaya Mauro Masters, DO

## 2017-07-10 NOTE — MR AVS SNAPSHOT
"              After Visit Summary   7/10/2017    Jazlyn Ann    MRN: [id number]           Patient Information     Date Of Birth          1995        Visit Information        Provider Department      7/10/2017 11:50 AM Amaya Restrepo DO [facility]        Today's Diagnoses     Encounter for  screening of mother    -  1    Supervision of normal IUP (intrauterine pregnancy) in multigravida, third trimester           Follow-ups after your visit        Your next 10 appointments already scheduled     2017 10:50 AM CDT   ESTABLISHED PRENATAL with Amaya Restrepo DO   [facility] [place] ([facility])    [address] [place] [address] [phone]              Who to contact     If you have questions or need follow up information about today's clinic visit or your schedule please contact [facility] [place] directly at [phone].  Normal or non-critical lab and imaging results will be communicated to you by Vaddiohart, letter or phone within 4 business days after the clinic has received the results. If you do not hear from us within 7 days, please contact the clinic through CloudWorkt or phone. If you have a critical or abnormal lab result, we will notify you by phone as soon as possible.  Submit refill requests through Maginatics or call your pharmacy and they will forward the refill request to us. Please allow 3 business days for your refill to be completed.          Additional Information About Your Visit        Vaddiohart Information     Maginatics lets you send messages to your doctor, view your test results, renew your prescriptions, schedule appointments and more. To sign up, go to [email or web address] . Click on \"Log in\" on the left side of the screen, which will take you to the Welcome page. Then click on \"Sign up Now\" on the right side of the page.     You will be asked to enter the access " code listed below, as well as some personal information. Please follow the directions to create your username and password.     Your access code is: [email or web address]  Expires: 2017  2:45 PM     Your access code will  in 90 days. If you need help or a new code, please call your [place] clinic or [phone].        Care EveryWhere ID     This is your Care EveryWhere ID. This could be used by other organizations to access your [place] medical records  [id number]        Your Vitals Were     Last Period BMI (Body Mass Index)                (LMP Unknown) 40.16 kg/m2           Blood Pressure from Last 3 Encounters:   07/10/17 108/66   17 104/60   17 114/70    Weight from Last 3 Encounters:   07/10/17 245 lb 12.8 oz (111.5 kg)   17 241 lb (109.3 kg)   17 242 lb (109.8 kg)              We Performed the Following     Strep, Group B by [facility]        Primary Care Provider Office Phone # Fax #    [facility] [phone]       [address] [place] [address]        Equal Access to Services     CASANDRA SOSA : Hadii clyde salinaso Zach, waaxda luqadaha, qaybta kaalmada adedavidyada, susie munoz. So [facility] [phone].    ATENCIÓN: Si habla español, tiene a ferrell disposición servicios gratuitos de asistencia lingüística. [facility] [phone].    We comply with applicable federal civil rights laws and [place] laws. We do not discriminate on the basis of race, color, national origin, age, disability sex, sexual orientation or gender identity.            Thank you!     Thank you for choosing [facility]  for your care. Our goal is always to provide you with excellent care. Hearing back from our patients is one way we can continue to improve our services. Please take a few minutes to complete the written survey that you may receive in the mail after your visit with us. Thank you!             Your Updated Medication  List - Protect others around you: Learn how to safely use, store and throw away your medicines at www.disposemymeds.org.          This list is accurate as of: 7/10/17 12:29 PM.  Always use your most recent med list.                   Brand Name Dispense Instructions for use Diagnosis    prenatal multivitamin  plus iron 27-0.8 MG Tabs per tablet     100 tablet    Take 1 tablet by mouth daily    Supervision of normal intrauterine pregnancy in multigravida, third trimester

## 2017-07-11 LAB
GP B STREP DNA SPEC QL NAA+PROBE: ABNORMAL
SPECIMEN SOURCE: ABNORMAL

## 2017-07-15 LAB
BACTERIA SPEC CULT: ABNORMAL
MICRO REPORT STATUS: ABNORMAL
MICROORGANISM SPEC CULT: ABNORMAL
SPECIMEN SOURCE: ABNORMAL

## 2017-07-17 ENCOUNTER — PRENATAL OFFICE VISIT (OUTPATIENT)
Dept: OBGYN | Facility: CLINIC | Age: 22
End: 2017-07-17
Payer: MEDICAID

## 2017-07-17 VITALS — SYSTOLIC BLOOD PRESSURE: 112 MMHG | DIASTOLIC BLOOD PRESSURE: 70 MMHG | BODY MASS INDEX: 40 KG/M2 | WEIGHT: 244.8 LBS

## 2017-07-17 DIAGNOSIS — B95.1 POSITIVE GBS TEST: Primary | ICD-10-CM

## 2017-07-17 DIAGNOSIS — Z34.83 SUPERVISION OF NORMAL IUP (INTRAUTERINE PREGNANCY) IN MULTIGRAVIDA, THIRD TRIMESTER: ICD-10-CM

## 2017-07-17 PROCEDURE — 99207 ZZC PRENATAL VISIT: CPT | Performed by: OBSTETRICS & GYNECOLOGY

## 2017-07-17 NOTE — MR AVS SNAPSHOT
After Visit Summary   7/17/2017    Jazlyn Ann    MRN: [id number]           Patient Information     Date Of Birth          1995        Visit Information        Provider Department      7/17/2017 10:50 AM Amaya Restrepo DO [facility] [place]        Today's Diagnoses     Positive GBS test    -  1    Supervision of normal IUP (intrauterine pregnancy) in multigravida, third trimester           Follow-ups after your visit        Follow-up notes from your care team     Return in about 1 week (around 7/24/2017).      Your next 10 appointments already scheduled     Jul 24, 2017 11:20 AM CDT   ESTABLISHED PRENATAL with Amaya Restrepo DO   [facility] [place] ([facility] [place])    [address]  [place] [address]   [phone]            Jul 31, 2017 10:50 AM CDT   ESTABLISHED PRENATAL with Amaya Restrepo DO   [facility] [place] ([facility])    [address]  [place] [address]   [phone]              Who to contact     If you have questions or need follow up information about today's clinic visit or your schedule please contact [facility] [place] directly at [phone].  Normal or non-critical lab and imaging results will be communicated to you by Lionsharp Voiceboardhart, letter or phone within 4 business days after the clinic has received the results. If you do not hear from us within 7 days, please contact the clinic through Lionsharp Voiceboardhart or phone. If you have a critical or abnormal lab result, we will notify you by phone as soon as possible.  Submit refill requests through Dubset Media or call your pharmacy and they will forward the refill request to us. Please allow 3 business days for your refill to be completed.          Additional Information About Your Visit        Dubset Media Information     Dubset Media lets you send messages to your doctor, view your test results,  "renew your prescriptions, schedule appointments and more. To sign up, go to [email or web address] . Click on \"Log in\" on the left side of the screen, which will take you to the Welcome page. Then click on \"Sign up Now\" on the right side of the page.     You will be asked to enter the access code listed below, as well as some personal information. Please follow the directions to create your username and password.     Your access code is: [email or web address]  Expires: 2017  2:45 PM     Your access code will  in 90 days. If you need help or a new code, please call your [place] clinic or [phone].        Care EveryWhere ID     This is your Care EveryWhere ID. This could be used by other organizations to access your [place] medical records  [id number]        Your Vitals Were     Last Period BMI (Body Mass Index)                (LMP Unknown) 40 kg/m2           Blood Pressure from Last 3 Encounters:   17 112/70   07/10/17 108/66   17 104/60    Weight from Last 3 Encounters:   17 244 lb 12.8 oz (111 kg)   07/10/17 245 lb 12.8 oz (111.5 kg)   17 241 lb (109.3 kg)              Today, you had the following     No orders found for display       Primary Care Provider Office Phone # Fax #    [facility] [phone]       [address] [place] [address]        Equal Access to Services     CASANDRA SOSA AH: Hadii clyde ku hadasho Soomaali, waaxda luqadaha, qaybta kaalmada adeegyada, susie munoz. So [facility] [phone].    ATENCIÓN: Si habla español, tiene a ferrell disposición servicios gratuitos de asistencia lingüística. Llame al [phone].    We comply with applicable federal civil rights laws and [place] laws. We do not discriminate on the basis of race, color, national origin, age, disability sex, sexual orientation or gender identity.            Thank you!     Thank you for choosing [facility] [place]  for your " care. Our goal is always to provide you with excellent care. Hearing back from our patients is one way we can continue to improve our services. Please take a few minutes to complete the written survey that you may receive in the mail after your visit with us. Thank you!             Your Updated Medication List - Protect others around you: Learn how to safely use, store and throw away your medicines at www.disposemymeds.org.          This list is accurate as of: 7/17/17 11:18 AM.  Always use your most recent med list.                   Brand Name Dispense Instructions for use Diagnosis    prenatal multivitamin  plus iron 27-0.8 MG Tabs per tablet     100 tablet    Take 1 tablet by mouth daily    Supervision of normal intrauterine pregnancy in multigravida, third trimester

## 2017-07-17 NOTE — PROGRESS NOTES
No loss of fluid/vaginal bleeding/regular contractions. + FM  -GBS Pos, tx in labor  -Discussed hemorrhoids and treatments, long term management.  -Plans to preregister at the hospital.  -Labor precautions. F/U 1wk.    Amaya Mauro Masters, DO

## 2017-07-21 ENCOUNTER — CARE COORDINATION (OUTPATIENT)
Dept: CARE COORDINATION | Facility: CLINIC | Age: 22
End: 2017-07-21

## 2017-07-21 NOTE — PROGRESS NOTES
Clinic Care Coordination Contact  OUTREACH    Referral Information:  Referral Source:  (OB)  Reason for Contact: follow up  Care Conference: No     Universal Utilization:   ED Visits in last year: 2  Hospital visits in last year: 0  Last PCP appointment: 03/16/17  Missed Appointments:  (0)  Concerns:  (Unplanned pregnancy; screen for depression)  Multiple Providers or Specialists: 1    Clinical Concerns:  Current Medical Concerns: Jazlyn has made several OB appts (labor precautions)    Current Behavioral Concerns: 0    Education Provided to patient: 0   Clinical Pathway Name: None  Clinical Pathway: None    Medication Management:  self     Functional Status:  Mobility Status: Independent  Equipment Currently Used at Home:  (none)  Transportation: self/others           Psychosocial:  Current living arrangement:: I live in a private home with spouse  Financial/Insurance: ok       Resources and Interventions:  Current Resources:  (na);  (OB new baby resources)  PAS Number:  (na)  Senior Linkage Line Referral Placed:  (na)  Advanced Care Plans/Directives on file:: No  Referrals Placed:  (na)     Goals:   Goal 1 Statement:  (I will schedule F/U OB appts)  Goal 1 Progression Percent: 90%  Goal 1 Progression Date: 07/21/17     Barriers: overcome and is making appts  Strengths: looking forward to birth of baby  Patient/Caregiver understanding: good  Frequency of Care Coordination:  (As needed)  Upcoming appointment: 07/03/17 (none)     Plan:  support on going.    Mily Davies [facility]  Clinic Care Coordinator-  Clinics: [place], [place], [place]  E-Mail: [email or web address]  Telephone: [phone]

## 2017-07-23 ENCOUNTER — NURSE TRIAGE (OUTPATIENT)
Dept: NURSING | Facility: CLINIC | Age: 22
End: 2017-07-23

## 2017-07-24 ENCOUNTER — PRENATAL OFFICE VISIT (OUTPATIENT)
Dept: OBGYN | Facility: CLINIC | Age: 22
End: 2017-07-24
Payer: MEDICAID

## 2017-07-24 VITALS — SYSTOLIC BLOOD PRESSURE: 110 MMHG | DIASTOLIC BLOOD PRESSURE: 62 MMHG | WEIGHT: 242 LBS | BODY MASS INDEX: 39.54 KG/M2

## 2017-07-24 DIAGNOSIS — Z34.83 SUPERVISION OF NORMAL IUP (INTRAUTERINE PREGNANCY) IN MULTIGRAVIDA, THIRD TRIMESTER: ICD-10-CM

## 2017-07-24 PROCEDURE — 99207 ZZC PRENATAL VISIT: CPT | Performed by: OBSTETRICS & GYNECOLOGY

## 2017-07-24 NOTE — MR AVS SNAPSHOT
"              After Visit Summary   7/24/2017    Jazlyn Ann    MRN: [id number]           Patient Information     Date Of Birth          1995        Visit Information        Provider Department      7/24/2017 11:20 AM Amaya Restrepo DO [facility]        Today's Diagnoses     Supervision of normal IUP (intrauterine pregnancy) in multigravida, third trimester           Follow-ups after your visit        Your next 10 appointments already scheduled     Jul 31, 2017 10:50 AM CDT   ESTABLISHED PRENATAL with Amaya Restrepo DO   [facility]    [address] [id number] [phone]              Who to contact     If you have questions or need follow up information about today's clinic visit or your schedule please contact [facility] [place] directly at [phone].  Normal or non-critical lab and imaging results will be communicated to you by MyChart, letter or phone within 4 business days after the clinic has received the results. If you do not hear from us within 7 days, please contact the clinic through Synergy Hubhart or phone. If you have a critical or abnormal lab result, we will notify you by phone as soon as possible.  Submit refill requests through Grimm Bros or call your pharmacy and they will forward the refill request to us. Please allow 3 business days for your refill to be completed.          Additional Information About Your Visit        Synergy Hubhart Information     Grimm Bros lets you send messages to your doctor, view your test results, renew your prescriptions, schedule appointments and more. To sign up, go to [email or web address] . Click on \"Log in\" on the left side of the screen, which will take you to the Welcome page. Then click on \"Sign up Now\" on the right side of the page.     You will be asked to enter the access code listed below, as well as some personal information. " Please follow the directions to create your username and password.     Your access code is: [email or web address]  Expires: 2017  2:45 PM     Your access code will  in 90 days. If you need help or a new code, please call your [place] clinic or [phone].        Care EveryWhere ID     This is your Care EveryWhere ID. This could be used by other organizations to access your [place] medical records  [id number]        Your Vitals Were     Last Period Breastfeeding? BMI (Body Mass Index)             (LMP Unknown) No 39.54 kg/m2          Blood Pressure from Last 3 Encounters:   17 110/62   17 112/70   07/10/17 108/66    Weight from Last 3 Encounters:   17 242 lb (109.8 kg)   17 244 lb 12.8 oz (111 kg)   07/10/17 245 lb 12.8 oz (111.5 kg)              Today, you had the following     No orders found for display       Primary Care Provider Office Phone # Fax #    [facility] [phone]       [address] [place] [address]        Equal Access to Services     CASANDRA SOSA AH: Hadii clyde mast hadasho Soyiali, waaxda luqadaha, qaybta kaalmada adeegyada, susie munoz. So [facility] [phone].    ATENCIÓN: Si habla español, tiene a ferrell disposición servicios gratuitos de asistencia lingüística. Llame al [phone].    We comply with applicable federal civil rights laws and [place] laws. We do not discriminate on the basis of race, color, national origin, age, disability sex, sexual orientation or gender identity.            Thank you!     Thank you for choosing [facility] [place]  for your care. Our goal is always to provide you with excellent care. Hearing back from our patients is one way we can continue to improve our services. Please take a few minutes to complete the written survey that you may receive in the mail after your visit with us. Thank you!             Your Updated Medication List - Protect others around  you: Learn how to safely use, store and throw away your medicines at www.disposemymeds.org.          This list is accurate as of: 7/24/17 12:13 PM.  Always use your most recent med list.                   Brand Name Dispense Instructions for use Diagnosis    prenatal multivitamin  plus iron 27-0.8 MG Tabs per tablet     100 tablet    Take 1 tablet by mouth daily    Supervision of normal intrauterine pregnancy in multigravida, third trimester

## 2017-07-24 NOTE — PROGRESS NOTES
No loss of fluid/vaginal bleeding/regular contractions. + FM  -SVE sl changed. Tirado 4. Discussed favorability/tirado scores, rules on IOL and gestational age and literature on timing IOL for fetal benefit. Will recheck next week, if favorable, will want IOL  -GBS pos, tx in labor.   -Discussed pain control options in labor, timing.   -Labor precautions. F/U 1wk. Discussed when to call clinic and if goes into labor or has emergency, to go to nearest hospital.    Amaya Mauro Masters, DO

## 2017-07-26 ENCOUNTER — HOSPITAL ENCOUNTER (OUTPATIENT)
Facility: CLINIC | Age: 22
Discharge: HOME | End: 2017-07-26
Attending: OBSTETRICS & GYNECOLOGY | Admitting: OBSTETRICS & GYNECOLOGY
Payer: MEDICAID

## 2017-07-26 ENCOUNTER — TELEPHONE (OUTPATIENT)
Dept: NURSING | Facility: CLINIC | Age: 22
End: 2017-07-26

## 2017-07-26 VITALS — SYSTOLIC BLOOD PRESSURE: 127 MMHG | DIASTOLIC BLOOD PRESSURE: 80 MMHG | TEMPERATURE: 97.5 F

## 2017-07-26 PROCEDURE — 99213 OFFICE O/P EST LOW 20 MIN: CPT | Mod: 25

## 2017-07-26 PROCEDURE — 40000809 ZZH STATISTIC NO DOCUMENTATION TO SUPPORT CHARGE

## 2017-07-26 PROCEDURE — 59025 FETAL NON-STRESS TEST: CPT

## 2017-07-26 PROCEDURE — 99213 OFFICE O/P EST LOW 20 MIN: CPT

## 2017-07-26 NOTE — IP AVS SNAPSHOT
MRN:[id number]                      After Visit Summary   7/26/2017    Jazlyn Ann    MRN: [id number]           Thank you!     Thank you for choosing [place] for your care. Our goal is always to provide you with excellent care. Hearing back from our patients is one way we can continue to improve our services. Please take a few minutes to complete the written survey that you may receive in the mail after you visit with us. Thank you!        Patient Information     Date Of Birth          1995        About your hospital stay     You were admitted on:  July 26, 2017 You last received care in the:  [facility]    You were discharged on:  July 26, 2017       Who to Call     For medical emergencies, please call 911.  For non-urgent questions about your medical care, please call your primary care provider or clinic, [phone]          Attending Provider     Provider Ashia Patterson MD OB/Gyn       Primary Care Provider Office Phone # Fax #    [facility] [phone]      Your next 10 appointments already scheduled     Jul 31, 2017 10:50 AM CDT   ESTABLISHED PRENATAL with Amaya Mauro Masters, DO   [facility] [place] ([facility] [place])    [address] [place] [id number] [phone]              Further instructions from your care team       Discharge Instruction for Undelivered Patients      You were seen for: Fetal Assessment for decreased fetal movement  We Consulted: Dr Yates  You had (Test or Medicine): fetal monitoring     Diet:   Drink 8 to 12 glasses of liquids (milk, juice, water) every day.  You may eat meals and snacks.     Activity:  Call your doctor or nurse midwife if your baby is moving less than usual.     Call your provider if you notice:  Swelling in your face or increased swelling in your hands or legs.  Headaches that are not relieved by Tylenol  "(acetaminophen).  Changes in your vision (blurring: seeing spots or stars.)  Nausea (sick to your stomach) and vomiting (throwing up).   Weight gain of 5 pounds or more per week.  Heartburn that doesn't go away.  Signs of bladder infection: pain when you urinate (use the toilet), need to go more often and more urgently.  The bag of chao (rupture of membranes) breaks, or you notice leaking in your underwear.  Bright red blood in your underwear.  Abdominal (lower belly) or stomach pain.  Second (plus) baby: Contractions (tightening) less than 10 minutes apart and getting stronger.  Increase or change in vaginal discharge (note the color and amount)      Follow-up:  As scheduled in the clinic          Pending Results     No orders found from 2017 to 2017.            Admission Information     Date & Time Provider Department Dept. Phone    2017 Ashia Yates MD [facility] [phone]      Your Vitals Were     Blood Pressure Temperature Last Period             127/80 97.5  F (36.4  C) (Temporal) (LMP Unknown)         MyChart Information     [employer] lets you send messages to your doctor, view your test results, renew your prescriptions, schedule appointments and more. To sign up, go to [email or web address] . Click on \"Log in\" on the left side of the screen, which will take you to the Welcome page. Then click on \"Sign up Now\" on the right side of the page.     You will be asked to enter the access code listed below, as well as some personal information. Please follow the directions to create your username and password.     Your access code is: [email or web address]  Expires: 2017  2:45 PM     Your access code will  in 90 days. If you need help or a new code, please call your [place] clinic or [phone].        Care EveryWhere ID     This is your Care EveryWhere ID. This could be used by other organizations to access your [place] medical records  [id number]        Equal Access to " Services     [facility]: Hadii clyde Srerano, waaxda luqadaha, qaybta kaalmada maria e, susie munoz. So [facility] [phone].    ATENCIÓN: Si habla español, tiene a ferrell disposición servicios gratuitos de asistencia lingüística. Llame al [phone].    We comply with applicable federal civil rights laws and [place] laws. We do not discriminate on the basis of race, color, national origin, age, disability sex, sexual orientation or gender identity.               Review of your medicines      UNREVIEWED medicines. Ask your doctor about these medicines        Dose / Directions    prenatal multivitamin  plus iron 27-0.8 MG Tabs per tablet   Used for:  Supervision of normal intrauterine pregnancy in multigravida, third trimester        Dose:  1 tablet   Take 1 tablet by mouth daily   Quantity:  100 tablet   Refills:  11       TYLENOL PO        Dose:  1000 mg   Take 1,000 mg by mouth every 8 hours as needed for mild pain or fever   Refills:  0                Protect others around you: Learn how to safely use, store and throw away your medicines at www.disposemymeds.org.             Medication List: This is a list of all your medications and when to take them. Check marks below indicate your daily home schedule. Keep this list as a reference.      Medications           Morning Afternoon Evening Bedtime As Needed    prenatal multivitamin  plus iron 27-0.8 MG Tabs per tablet   Take 1 tablet by mouth daily                                TYLENOL PO   Take 1,000 mg by mouth every 8 hours as needed for mild pain or fever

## 2017-07-26 NOTE — TELEPHONE ENCOUNTER
38w4d; JAQUI: 15; GBS Positive;   Pt calling believes she passed her mucous plug this morning in her underwear. States it's snotty mush like, bigger than a quarter, dark pink to red in color, denies vaginal bleeding, denies LOF, denies fever, hasn't felt baby move this morning and because she works nights she is not sure if baby is active around this time-his schedule, states she felt baby last night. Informed mucous plug is regenerative but that it does indicate the process is starting. Pt inquired about GBS and transmitting to baby. Answered questions and that baby is not exposed right now as is in the amniotic sac, baby is exposed when water is broken and going through the birth canal. She is go to to the hospital if she has leaking of fluid, or if she starts having regular, contractions, every 3-5 minutes, lasting a minute for an hr, or if they're painful. Pt verbalized understanding. Recommended going to [facility] due to decrease movement. Pt states she would be there in 1.5 hrs at the earliest (aroudn 11:30 AM), she has to get a . Informed to be there as soon as she can.    Dr. Yates informed (on-call), Dr. Restrepo off today; MAC informed.

## 2017-07-26 NOTE — IP AVS SNAPSHOT
[address], [address]    [facility] [address]    Phone:  [phone]                                       After Visit Summary   7/26/2017    Jazlyn Ann    MRN: [id number]           After Visit Summary Signature Page     I have received my discharge instructions, and my questions have been answered. I have discussed any challenges I see with this plan with the nurse or doctor.    ..........................................................................................................................................  Patient/Patient Representative Signature      ..........................................................................................................................................  Patient Representative Print Name and Relationship to Patient    ..................................................               ................................................  Date                                            Time    ..........................................................................................................................................  Reviewed by Signature/Title    ...................................................              ..............................................  Date                                                            Time

## 2017-07-27 NOTE — DISCHARGE INSTRUCTIONS
Discharge Instruction for Undelivered Patients      You were seen for: Fetal Assessment for decreased fetal movement  We Consulted: Dr Yates  You had (Test or Medicine): fetal monitoring     Diet:   Drink 8 to 12 glasses of liquids (milk, juice, water) every day.  You may eat meals and snacks.     Activity:  Call your doctor or nurse midwife if your baby is moving less than usual.     Call your provider if you notice:  Swelling in your face or increased swelling in your hands or legs.  Headaches that are not relieved by Tylenol (acetaminophen).  Changes in your vision (blurring: seeing spots or stars.)  Nausea (sick to your stomach) and vomiting (throwing up).   Weight gain of 5 pounds or more per week.  Heartburn that doesn't go away.  Signs of bladder infection: pain when you urinate (use the toilet), need to go more often and more urgently.  The bag of chao (rupture of membranes) breaks, or you notice leaking in your underwear.  Bright red blood in your underwear.  Abdominal (lower belly) or stomach pain.  Second (plus) baby: Contractions (tightening) less than 10 minutes apart and getting stronger.  Increase or change in vaginal discharge (note the color and amount)      Follow-up:  As scheduled in the clinic

## 2017-07-27 NOTE — PLAN OF CARE
Data: Patient presented to the [facility] at .   Reason for maternal/fetal assessment per patient is Decreased Fetal Movement  . Patient is a . Prenatal record reviewed.      Obstetric History       T2      L1     SAB0   TAB0   Ectopic0   Multiple0   Live Births1       # Outcome Date GA Lbr Fredrick/2nd Weight Sex Delivery Anes PTL Lv   4 Current            3 AB 2016 9w0d          2 Term 12/15/15 40w1d    Vag-Spont      1 Term 14 39w6d 05:15 / 02:27 3.742 kg (8 lb 4 oz) M Vag-Spont EPI N DHEERAJ      Name: TYRONE LONG      Apgar1:  9                Apgar5: 9         Medical History:   Past Medical History:   Diagnosis Date     Chlamydia 2014     Chronic kidney disease     kidney stone      Depressive disorder     during previous pregnancy/ situational, postpartum it was diagnosed with second baby.     Gonorrhea  2014   . Gestational Age 38w4d. VSS. Cervix: not examined.  Fetal movement present. Patient denies cramping, backache, vaginal discharge, pelvic pressure, UTI symptoms, GI problems, bloody show, vaginal bleeding, edema, headache, visual disturbances, epigastric or URQ pain, abdominal pain, rupture of membranes. Support persons not present.  Action: Verbal consent for EFM. Triage assessment completed. EFM applied for reassurance. Uterine assessment contractions. Fetal assessment: Presumed adequate fetal oxygenation documented (see flow record). Patient education pamphlets given on fetal movement counts. Discussed GBS positive, come in early when in labor to ensure she receives antibiotics. Patient instructed to report change in fetal movement, vaginal leaking of fluid or bleeding, abdominal pain, or any concerns related to the pregnancy to her nurse/physician.   Response: Dr. Yates informed of NST. Plan per provider is dc to home. Patient verbalized understanding of education and verbalized agreement with plan. Discharged ambulatory at .

## 2017-07-28 ENCOUNTER — NURSE TRIAGE (OUTPATIENT)
Dept: NURSING | Facility: CLINIC | Age: 22
End: 2017-07-28

## 2017-07-29 NOTE — TELEPHONE ENCOUNTER
Caller states that she has been researching being GBS positive and provider seemed to not be overly concerned, she notes that she is very concerned and wants 2nd opinion from another doctor now. She states that she delivered her first child within 7 minutes and she wants to have the best plan in place, declined triage advice and insisted on page to on call.     Paged on Call OB Dr. Lewis via smart web at 7:50 pm, Call pt. Jazlyn Roach, [phone],  1995, [address], PCP Dr. Restrepo, pt inisisted on page for another opinion about being GBS positive, concerns after researching online. Thanks!      Reason for Disposition    [1] Caller requesting NON-URGENT health information AND [2] PCP's office is the best resource    Additional Information    Negative: [1] Caller is not with the adult (patient) AND [2] reporting urgent symptoms    Negative: Lab result questions    Negative: Medication questions    Negative: Caller cannot be reached by phone    Negative: Caller has already spoken to PCP or another triager    Negative: Requesting regular office appointment    Protocols used: INFORMATION ONLY CALL-ADULT-    Norma Roy, RN, BSN  [facility]

## 2017-07-31 ENCOUNTER — PRENATAL OFFICE VISIT (OUTPATIENT)
Dept: OBGYN | Facility: CLINIC | Age: 22
End: 2017-07-31
Payer: MEDICAID

## 2017-07-31 ENCOUNTER — HOSPITAL ENCOUNTER (OUTPATIENT)
Facility: CLINIC | Age: 22
Discharge: HOME | End: 2017-07-31
Attending: OBSTETRICS & GYNECOLOGY | Admitting: OBSTETRICS & GYNECOLOGY
Payer: MEDICAID

## 2017-07-31 ENCOUNTER — NURSE TRIAGE (OUTPATIENT)
Dept: NURSING | Facility: CLINIC | Age: 22
End: 2017-07-31

## 2017-07-31 VITALS — BODY MASS INDEX: 39.54 KG/M2 | DIASTOLIC BLOOD PRESSURE: 62 MMHG | WEIGHT: 245 LBS | SYSTOLIC BLOOD PRESSURE: 112 MMHG

## 2017-07-31 VITALS
RESPIRATION RATE: 16 BRPM | BODY MASS INDEX: 38.89 KG/M2 | WEIGHT: 242 LBS | TEMPERATURE: 97.9 F | HEIGHT: 66 IN | SYSTOLIC BLOOD PRESSURE: 117 MMHG | DIASTOLIC BLOOD PRESSURE: 61 MMHG

## 2017-07-31 DIAGNOSIS — Z34.83 SUPERVISION OF NORMAL IUP (INTRAUTERINE PREGNANCY) IN MULTIGRAVIDA, THIRD TRIMESTER: ICD-10-CM

## 2017-07-31 PROBLEM — Z36.89 ENCOUNTER FOR TRIAGE IN PREGNANT PATIENT: Status: ACTIVE | Noted: 2017-07-31

## 2017-07-31 LAB — A1 MICROGLOB PLACENTAL VAG QL: NEGATIVE

## 2017-07-31 PROCEDURE — 99207 ZZC PRENATAL VISIT: CPT | Performed by: OBSTETRICS & GYNECOLOGY

## 2017-07-31 PROCEDURE — 99214 OFFICE O/P EST MOD 30 MIN: CPT | Mod: 25

## 2017-07-31 PROCEDURE — 84112 EVAL AMNIOTIC FLUID PROTEIN: CPT | Performed by: OBSTETRICS & GYNECOLOGY

## 2017-07-31 PROCEDURE — 59025 FETAL NON-STRESS TEST: CPT | Performed by: OBSTETRICS & GYNECOLOGY

## 2017-07-31 PROCEDURE — 59425 ANTEPARTUM CARE ONLY: CPT | Performed by: OBSTETRICS & GYNECOLOGY

## 2017-07-31 PROCEDURE — 59025 FETAL NON-STRESS TEST: CPT

## 2017-07-31 NOTE — TELEPHONE ENCOUNTER
"  Reason for Disposition    [1] Leakage of fluid from vagina AND [2] leakage started < 4 hours ago    Additional Information    Negative: Passed out (i.e., lost consciousness, collapsed and was not responding)    Negative: Shock suspected (e.g., cold/pale/clammy skin, too weak to stand, low BP, rapid pulse)    Negative: Difficult to awaken or acting confused  (e.g., disoriented, slurred speech)    Negative: [1] SEVERE abdominal pain (e.g., excruciating) AND [2] constant AND [3] present > 1 hour    Negative: Severe bleeding (e.g., continuous red blood from vagina, or large blood clots)    Negative: Umbilical cord hanging out of the vagina (shiny, white, curled appearance, \"like telephone cord\")    Negative: Uncontrollable urge to push (i.e., feels like baby is coming out now)    Negative: Can see baby    Negative: Sounds like a life-threatening emergency to the triager    Negative: Pregnant < 37 weeks (i.e., )    Negative: [1] Uncertain delivery date AND [2] possibly pregnant < 37 weeks (i.e., )    Negative: [1] First baby (primipara) AND [2] contractions < 6 minutes apart  AND [3] present 2 hours    Negative: [1] History of prior delivery (multipara) AND [2] contractions < 10 minutes apart AND [3] present 1 hour    Negative: [1] History of rapid prior delivery AND [2] contractions < 10 minutes apart    Negative: [1] Leakage of fluid from vagina AND [2] green or brown in color    Negative: [1] Leakage of fluid from vagina AND [2] leakage started > 4 hours ago    Negative: Vaginal bleeding or spotting    Negative: Baby moving less today (e.g., kick count < 5 in 1 hour or < 10 in 2 hours)    Negative: New hand or face swelling    Negative: MODERATE-SEVERE abdominal pain    Negative: Fever > 100.4 F (38.0 C)    Prot[facility]    Called Labor and Delivery at [facility] and gave report to Frank who answered the phone. Patient has a history of rapid delivery.  I advised Jazlyn to " have her  bring her to labor and delvery.  hSarri Stapleton RN-[facility]

## 2017-07-31 NOTE — PLAN OF CARE
"Data: Patient presented to the Birthplace at 0223.   Reason for maternal/fetal assessment per patient is Spontaneous Rupture of Membrane  Patient is a . Prenatal record reviewed.   Gestational Age 39w2d. VSS. Cervix: dilated to 3 and effaced 70%.Patient states she was 2.5 in the clinic last.  Fetal movement present. Patient states she was having intercourse when she noticed a \"massive amount of fluid leaking\". Patient denies cramping, backache, vaginal discharge, pelvic pressure, UTI symptoms, GI problems, bloody show, vaginal bleeding, edema, headache, visual disturbances, epigastric or URQ pain, abdominal pain. Support person present.  Action: Verbal consent for EFM. Triage assessment completed. EFM applied for fetal wellbeing. Uterine assessment completed, irritability noted. Patient states she feels Edis-Maloney contractions which she has been experiencing. States they are not \"screaming contractions\", just tightenings. Fetal assessment: Presumed adequate fetal oxygenation documented (see flow record). Patient education pamphlets given on fetal movement counts and discharge instructions. Patient instructed to report change in fetal movement, vaginal leaking of fluid or bleeding, abdominal pain, or any concerns related to the pregnancy to her nurse/physician.   Response: Dr. Lewis informed of patient arrival, amnisure negative, SVE, and FHR tracing. Plan per provider is discharge to home with follow up in clinic as scheduled. Patient verbalized understanding of education and verbalized agreement with plan. Discharged ambulatory at 0315.    "

## 2017-07-31 NOTE — MR AVS SNAPSHOT
"              After Visit Summary   2017    Jazlyn Ann    MRN: [id number]           Patient Information     Date Of Birth          1995        Visit Information        Provider Department      2017 10:50 AM sAmaya,  [facility]        Today's Diagnoses     Supervision of normal IUP (intrauterine pregnancy) in multigravida, third trimester           Follow-ups after your visit        Follow-up notes from your care team     Return in about 3 days (around 8/3/2017).      Who to contact     If you have questions or need follow up information about today's clinic visit or your schedule please contact [facility] directly at [phone].  Normal or non-critical lab and imaging results will be communicated to you by MyChart, letter or phone within 4 business days after the clinic has received the results. If you do not hear from us within 7 days, please contact the clinic through [employer] or phone. If you have a critical or abnormal lab result, we will notify you by phone as soon as possible.  Submit refill requests through [facility] or call your pharmacy and they will forward the refill request to us. Please allow 3 business days for your refill to be completed.          Additional Information About Your Visit        MyChart Information     [facility] lets you send messages to your doctor, view your test results, renew your prescriptions, schedule appointments and more. To sign up, go to [email or web address] . Click on \"Log in\" on the left side of the screen, which will take you to the Welcome page. Then click on \"Sign up Now\" on the right side of the page.     You will be asked to enter the access code listed below, as well as some personal information. Please follow the directions to create your username and password.     Your access code is: [email or web address]  Expires: 2017  2:45 PM     Your access code will  in 90 days. If you need help or " a new code, please call your [facility] clinic or [phone].        Care EveryWhere ID     This is your Care EveryWhere ID. This could be used by other organizations to access your [facility] medical records  [id number]        Your Vitals Were     Last Period BMI (Body Mass Index)                (LMP Unknown) 39.54 kg/m2           Blood Pressure from Last 3 Encounters:   07/31/17 112/62   07/31/17 117/61   07/26/17 127/80    Weight from Last 3 Encounters:   07/31/17 245 lb (111.1 kg)   07/31/17 242 lb (109.8 kg)   07/24/17 242 lb (109.8 kg)              Today, you had the following     No orders found for display       Primary Care Provider Office Phone # Fax #    [facility] [phone]       [address] [facility] [address]        Equal Access to Services     CASANDRA SOSA : Hadii clyde Serrano, waaxda lucharlette, qaybta kaalmada maria e, susie grajeda . So [facility] [phone].    ATENCIÓN: Si habla español, tiene a ferrell disposición servicios gratuitos de asistencia lingüística. Llame al [phone].    We comply with applicable federal civil rights laws and [place] laws. We do not discriminate on the basis of race, color, national origin, age, disability sex, sexual orientation or gender identity.            Thank you!     Thank you for choosing [facility]  for your care. Our goal is always to provide you with excellent care. Hearing back from our patients is one way we can continue to improve our services. Please take a few minutes to complete the written survey that you may receive in the mail after your visit with us. Thank you!             Your Updated Medication List - Protect others around you: Learn how to safely use, store and throw away your medicines at www.disposemymeds.org.          This list is accurate as of: 7/31/17 11:33 AM.  Always use your most recent med list.                   Brand Name Dispense  Instructions for use Diagnosis    prenatal multivitamin  plus iron 27-0.8 MG Tabs per tablet     100 tablet    Take 1 tablet by mouth daily    Supervision of normal intrauterine pregnancy in multigravida, third trimester       TYLENOL PO      Take 1,000 mg by mouth every 8 hours as needed for mild pain or fever

## 2017-07-31 NOTE — IP AVS SNAPSHOT
[address], [address]    [facility] [address]    Phone:  [phone]                                       After Visit Summary   7/31/2017    Jazlyn Ann    MRN: [id number]           After Visit Summary Signature Page     I have received my discharge instructions, and my questions have been answered. I have discussed any challenges I see with this plan with the nurse or doctor.    ..........................................................................................................................................  Patient/Patient Representative Signature      ..........................................................................................................................................  Patient Representative Print Name and Relationship to Patient    ..................................................               ................................................  Date                                            Time    ..........................................................................................................................................  Reviewed by Signature/Title    ...................................................              ..............................................  Date                                                            Time

## 2017-07-31 NOTE — IP AVS SNAPSHOT
MRN:[id number]                      After Visit Summary   7/31/2017    Jazlyn Ann    MRN: [id number]           Thank you!     Thank you for choosing [facility] for your care. Our goal is always to provide you with excellent care. Hearing back from our patients is one way we can continue to improve our services. Please take a few minutes to complete the written survey that you may receive in the mail after you visit with us. Thank you!        Patient Information     Date Of Birth          1995        About your hospital stay     You were admitted on:  July 31, 2017 You last received care in the:  [facility]    You were discharged on:  July 31, 2017       Who to Call     For medical emergencies, please call 911.  For non-urgent questions about your medical care, please call your primary care provider or clinic, [phone]          Attending Provider     Provider Maribel Myers MD OB/Gyn       Primary Care Provider Office Phone # Fax #    [facility] [phone]      Your next 10 appointments already scheduled     Jul 31, 2017 10:50 AM CDT   ESTABLISHED PRENATAL with Amaya Mauro Masters, DO   [facility] ([facility])    [address] [place] [address] [phone]              Further instructions from your care team       Discharge Instruction for Undelivered Patients      You were seen for: Membrane Assessment  We Consulted: Dr. Lewis  You had (Test or Medicine):Electronic fetal & uterine monitoring, Amnisure (negative), & Vaginal exam     Diet:   Drink 8 to 12 glasses of liquids (milk, juice, water) every day.  You may eat meals and snacks.     Activity:  Count fetal kicks everyday (see handout)  Call your doctor or nurse midwife if your baby is moving less than usual.     Call your provider if you notice:  Swelling in your face or increased swelling in your  "hands or legs.  Headaches that are not relieved by Tylenol (acetaminophen).  Changes in your vision (blurring: seeing spots or stars.)  Nausea (sick to your stomach) and vomiting (throwing up).   Weight gain of 5 pounds or more per week.  Heartburn that doesn't go away.  Signs of bladder infection: pain when you urinate (use the toilet), need to go more often and more urgently.  The bag of chao (rupture of membranes) breaks, or you notice leaking in your underwear.  Bright red blood in your underwear.  Abdominal (lower belly) or stomach pain.  Second (plus) baby: Contractions (tightening) less than 10 minutes apart and getting stronger.  Increase or change in vaginal discharge (note the color and amount)      Follow-up:  As scheduled in the clinic          Pending Results     No orders found from 2017 to 2017.            Admission Information     Date & Time Provider Department Dept. Phone    2017 Maribel Lewis MD [facility] [phone]      Your Vitals Were     Height Weight Last Period BMI (Body Mass Index)          1.676 m (5' 6\") 109.8 kg (242 lb) (LMP Unknown) 39.06 kg/m2        MyChart Information     [facility] lets you send messages to your doctor, view your test results, renew your prescriptions, schedule appointments and more. To sign up, go to [email or web address] . Click on \"Log in\" on the left side of the screen, which will take you to the Welcome page. Then click on \"Sign up Now\" on the right side of the page.     You will be asked to enter the access code listed below, as well as some personal information. Please follow the directions to create your username and password.     Your access code is: [email or web address]  Expires: 2017  2:45 PM     Your access code will  in 90 days. If you need help or a new code, please call your [facility] [place] clinic or [phone].        Care EveryWhere ID     This is your Care EveryWhere ID. This could be used by other organizations to " access your [facility] medical records  [id number]        Equal Access to Services     CASANDRA SOSA : Hadii aad ku haddrechristie Soyiali, wateodorada luqadaha, qaybta kaalmada tiffaniearyanmarco, susie owenchynaisidro munoz. So [facility] [phone].    ATENCIÓN: Si habla español, tiene a ferrell disposición servicios gratuitos de asistencia lingüística. Llame al [phone].    We comply with applicable federal civil rights laws and [place] laws. We do not discriminate on the basis of race, color, national origin, age, disability sex, sexual orientation or gender identity.               Review of your medicines      UNREVIEWED medicines. Ask your doctor about these medicines        Dose / Directions    prenatal multivitamin  plus iron 27-0.8 MG Tabs per tablet   Used for:  Supervision of normal intrauterine pregnancy in multigravida, third trimester        Dose:  1 tablet   Take 1 tablet by mouth daily   Quantity:  100 tablet   Refills:  11       TYLENOL PO        Dose:  1000 mg   Take 1,000 mg by mouth every 8 hours as needed for mild pain or fever   Refills:  0                Protect others around you: Learn how to safely use, store and throw away your medicines at www.disposemymeds.org.             Medication List: This is a list of all your medications and when to take them. Check marks below indicate your daily home schedule. Keep this list as a reference.      Medications           Morning Afternoon Evening Bedtime As Needed    prenatal multivitamin  plus iron 27-0.8 MG Tabs per tablet   Take 1 tablet by mouth daily                                TYLENOL PO   Take 1,000 mg by mouth every 8 hours as needed for mild pain or fever

## 2017-07-31 NOTE — PROGRESS NOTES
No loss of fluid/vaginal bleeding/regular contractions. + FM  -GBS pos. Tx in labor. Discussed treatment guidelines, monitoring infant if incomplete treatment.   Can try to schedule IOL so as to control ab administration  -Membranes stripped. Will return Thursday for repeat eval, possible IOL  - Labor precautions. F/U 3d.    Amaya Mauro Masters, DO

## 2017-07-31 NOTE — DISCHARGE INSTRUCTIONS
Discharge Instruction for Undelivered Patients      You were seen for: Membrane Assessment  We Consulted: Dr. Lewis  You had (Test or Medicine):Electronic fetal & uterine monitoring, Amnisure (negative), & Vaginal exam     Diet:   Drink 8 to 12 glasses of liquids (milk, juice, water) every day.  You may eat meals and snacks.     Activity:  Count fetal kicks everyday (see handout)  Call your doctor or nurse midwife if your baby is moving less than usual.     Call your provider if you notice:  Swelling in your face or increased swelling in your hands or legs.  Headaches that are not relieved by Tylenol (acetaminophen).  Changes in your vision (blurring: seeing spots or stars.)  Nausea (sick to your stomach) and vomiting (throwing up).   Weight gain of 5 pounds or more per week.  Heartburn that doesn't go away.  Signs of bladder infection: pain when you urinate (use the toilet), need to go more often and more urgently.  The bag of chao (rupture of membranes) breaks, or you notice leaking in your underwear.  Bright red blood in your underwear.  Abdominal (lower belly) or stomach pain.  Second (plus) baby: Contractions (tightening) less than 10 minutes apart and getting stronger.  Increase or change in vaginal discharge (note the color and amount)      Follow-up:  As scheduled in the clinic

## 2017-08-01 ENCOUNTER — TELEPHONE (OUTPATIENT)
Dept: OBGYN | Facility: CLINIC | Age: 22
End: 2017-08-01

## 2017-08-01 NOTE — TELEPHONE ENCOUNTER
Please call pt back , she is 39 weeks . She would not tell me anything ,just that she needed to talk to the nurse. Please call her main number

## 2017-08-02 ENCOUNTER — NURSE TRIAGE (OUTPATIENT)
Dept: NURSING | Facility: CLINIC | Age: 22
End: 2017-08-02

## 2017-08-02 NOTE — TELEPHONE ENCOUNTER
"  Reason for Disposition    [1] Leakage of fluid from vagina AND [2] leakage started < 4 hours ago    Additional Information    Negative: Passed out (i.e., lost consciousness, collapsed and was not responding)    Negative: Shock suspected (e.g., cold/pale/clammy skin, too weak to stand, low BP, rapid pulse)    Negative: Difficult to awaken or acting confused  (e.g., disoriented, slurred speech)    Negative: [1] SEVERE abdominal pain (e.g., excruciating) AND [2] constant AND [3] present > 1 hour    Negative: Severe bleeding (e.g., continuous red blood from vagina, or large blood clots)    Negative: Umbilical cord hanging out of the vagina (shiny, white, curled appearance, \"like telephone cord\")    Negative: Uncontrollable urge to push (i.e., feels like baby is coming out now)    Negative: Can see baby    Negative: Sounds like a life-threatening emergency to the triager    Negative: Pregnant < 37 weeks (i.e., )    Negative: [1] Uncertain delivery date AND [2] possibly pregnant < 37 weeks (i.e., )    Negative: [1] First baby (primipara) AND [2] contractions < 6 minutes apart  AND [3] present 2 hours    Negative: [1] History of prior delivery (multipara) AND [2] contractions < 10 minutes apart AND [3] present 1 hour    Negative: [1] History of rapid prior delivery AND [2] contractions < 10 minutes apart    Negative: [1] Leakage of fluid from vagina AND [2] green or brown in color    Negative: [1] Leakage of fluid from vagina AND [2] leakage started > 4 hours ago    Negative: Vaginal bleeding or spotting    Negative: Baby moving less today (e.g., kick count < 5 in 1 hour or < 10 in 2 hours)    Negative: New hand or face swelling    Negative: MODERATE-SEVERE abdominal pain    Negative: Fever > 100.4 F (38.0 C)    Protocols used: PREGNANCY - LABOR-ADULT-AH    I called and gave report to a RN at [facility].  Sahrri Stapleton RN-[facility]    "

## 2017-08-03 ENCOUNTER — ANESTHESIA (OUTPATIENT)
Dept: OBGYN | Facility: CLINIC | Age: 22
End: 2017-08-03
Payer: COMMERCIAL

## 2017-08-03 ENCOUNTER — PRENATAL OFFICE VISIT (OUTPATIENT)
Dept: OBGYN | Facility: CLINIC | Age: 22
End: 2017-08-03
Payer: COMMERCIAL

## 2017-08-03 ENCOUNTER — ANESTHESIA EVENT (OUTPATIENT)
Dept: OBGYN | Facility: CLINIC | Age: 22
End: 2017-08-03
Payer: COMMERCIAL

## 2017-08-03 ENCOUNTER — HOSPITAL ENCOUNTER (INPATIENT)
Facility: CLINIC | Age: 22
LOS: 2 days | Discharge: HOME | End: 2017-08-05
Attending: OBSTETRICS & GYNECOLOGY | Admitting: OBSTETRICS & GYNECOLOGY
Payer: COMMERCIAL

## 2017-08-03 VITALS — BODY MASS INDEX: 39.06 KG/M2 | DIASTOLIC BLOOD PRESSURE: 72 MMHG | WEIGHT: 242 LBS | SYSTOLIC BLOOD PRESSURE: 112 MMHG

## 2017-08-03 DIAGNOSIS — Z34.83 SUPERVISION OF NORMAL IUP (INTRAUTERINE PREGNANCY) IN MULTIGRAVIDA, THIRD TRIMESTER: ICD-10-CM

## 2017-08-03 LAB
ABO + RH BLD: NORMAL
ABO + RH BLD: NORMAL
BASOPHILS # BLD AUTO: 0 10E9/L (ref 0–0.2)
BASOPHILS NFR BLD AUTO: 0.1 %
DIFFERENTIAL METHOD BLD: ABNORMAL
EOSINOPHIL # BLD AUTO: 0.2 10E9/L (ref 0–0.7)
EOSINOPHIL NFR BLD AUTO: 2.2 %
ERYTHROCYTE [DISTWIDTH] IN BLOOD BY AUTOMATED COUNT: 16.2 % (ref 10–15)
HCT VFR BLD AUTO: 34 % (ref 35–47)
HGB BLD-MCNC: 11.1 G/DL (ref 11.7–15.7)
IMM GRANULOCYTES # BLD: 0 10E9/L (ref 0–0.4)
IMM GRANULOCYTES NFR BLD: 0.2 %
LYMPHOCYTES # BLD AUTO: 2.3 10E9/L (ref 0.8–5.3)
LYMPHOCYTES NFR BLD AUTO: 27.6 %
MCH RBC QN AUTO: 29.4 PG (ref 26.5–33)
MCHC RBC AUTO-ENTMCNC: 32.6 G/DL (ref 31.5–36.5)
MCV RBC AUTO: 90 FL (ref 78–100)
MONOCYTES # BLD AUTO: 0.6 10E9/L (ref 0–1.3)
MONOCYTES NFR BLD AUTO: 7.5 %
NEUTROPHILS # BLD AUTO: 5.2 10E9/L (ref 1.6–8.3)
NEUTROPHILS NFR BLD AUTO: 62.4 %
NRBC # BLD AUTO: 0 10*3/UL
NRBC BLD AUTO-RTO: 0 /100
PLATELET # BLD AUTO: 181 10E9/L (ref 150–450)
RBC # BLD AUTO: 3.77 10E12/L (ref 3.8–5.2)
SPECIMEN EXP DATE BLD: NORMAL
WBC # BLD AUTO: 8.3 10E9/L (ref 4–11)

## 2017-08-03 PROCEDURE — 25000125 ZZHC RX 250: Performed by: ANESTHESIOLOGY

## 2017-08-03 PROCEDURE — 25000128 H RX IP 250 OP 636: Performed by: OBSTETRICS & GYNECOLOGY

## 2017-08-03 PROCEDURE — 25000128 H RX IP 250 OP 636: Performed by: ANESTHESIOLOGY

## 2017-08-03 PROCEDURE — 37000011 ZZH ANESTHESIA WARD SERVICE

## 2017-08-03 PROCEDURE — 88307 TISSUE EXAM BY PATHOLOGIST: CPT | Performed by: OBSTETRICS & GYNECOLOGY

## 2017-08-03 PROCEDURE — 59410 OBSTETRICAL CARE: CPT | Performed by: OBSTETRICS & GYNECOLOGY

## 2017-08-03 PROCEDURE — 85025 COMPLETE CBC W/AUTO DIFF WBC: CPT | Performed by: OBSTETRICS & GYNECOLOGY

## 2017-08-03 PROCEDURE — 86901 BLOOD TYPING SEROLOGIC RH(D): CPT | Performed by: OBSTETRICS & GYNECOLOGY

## 2017-08-03 PROCEDURE — 25000132 ZZH RX MED GY IP 250 OP 250 PS 637: Performed by: OBSTETRICS & GYNECOLOGY

## 2017-08-03 PROCEDURE — 72200001 ZZH LABOR CARE VAGINAL DELIVERY SINGLE

## 2017-08-03 PROCEDURE — 00HU33Z INSERTION OF INFUSION DEVICE INTO SPINAL CANAL, PERCUTANEOUS APPROACH: ICD-10-PCS | Performed by: ANESTHESIOLOGY

## 2017-08-03 PROCEDURE — 25000125 ZZHC RX 250: Performed by: OBSTETRICS & GYNECOLOGY

## 2017-08-03 PROCEDURE — 86780 TREPONEMA PALLIDUM: CPT | Performed by: OBSTETRICS & GYNECOLOGY

## 2017-08-03 PROCEDURE — 99212 OFFICE O/P EST SF 10 MIN: CPT | Performed by: OBSTETRICS & GYNECOLOGY

## 2017-08-03 PROCEDURE — 88307 TISSUE EXAM BY PATHOLOGIST: CPT | Mod: 26 | Performed by: OBSTETRICS & GYNECOLOGY

## 2017-08-03 PROCEDURE — 86900 BLOOD TYPING SEROLOGIC ABO: CPT | Performed by: OBSTETRICS & GYNECOLOGY

## 2017-08-03 PROCEDURE — 12000037 ZZH R&B POSTPARTUM INTERMEDIATE

## 2017-08-03 PROCEDURE — 3E0R3CZ INTRODUCTION OF REGIONAL ANESTHETIC INTO SPINAL CANAL, PERCUTANEOUS APPROACH: ICD-10-PCS | Performed by: ANESTHESIOLOGY

## 2017-08-03 PROCEDURE — 36415 COLL VENOUS BLD VENIPUNCTURE: CPT | Performed by: OBSTETRICS & GYNECOLOGY

## 2017-08-03 RX ORDER — NALOXONE HYDROCHLORIDE 0.4 MG/ML
.1-.4 INJECTION, SOLUTION INTRAMUSCULAR; INTRAVENOUS; SUBCUTANEOUS
Status: DISCONTINUED | OUTPATIENT
Start: 2017-08-03 | End: 2017-08-05 | Stop reason: HOSPADM

## 2017-08-03 RX ORDER — IBUPROFEN 600 MG/1
600 TABLET, FILM COATED ORAL EVERY 6 HOURS PRN
Status: DISCONTINUED | OUTPATIENT
Start: 2017-08-03 | End: 2017-08-05 | Stop reason: HOSPADM

## 2017-08-03 RX ORDER — HYDROCORTISONE 2.5 %
CREAM (GRAM) TOPICAL 3 TIMES DAILY PRN
Status: DISCONTINUED | OUTPATIENT
Start: 2017-08-03 | End: 2017-08-05 | Stop reason: HOSPADM

## 2017-08-03 RX ORDER — OXYTOCIN/0.9 % SODIUM CHLORIDE 30/500 ML
100 PLASTIC BAG, INJECTION (ML) INTRAVENOUS CONTINUOUS
Status: DISCONTINUED | OUTPATIENT
Start: 2017-08-03 | End: 2017-08-05 | Stop reason: HOSPADM

## 2017-08-03 RX ORDER — BISACODYL 10 MG
10 SUPPOSITORY, RECTAL RECTAL DAILY PRN
Status: DISCONTINUED | OUTPATIENT
Start: 2017-08-05 | End: 2017-08-05 | Stop reason: HOSPADM

## 2017-08-03 RX ORDER — IBUPROFEN 800 MG/1
800 TABLET, FILM COATED ORAL
Status: DISCONTINUED | OUTPATIENT
Start: 2017-08-03 | End: 2017-08-03

## 2017-08-03 RX ORDER — EPHEDRINE SULFATE 50 MG/ML
5 INJECTION, SOLUTION INTRAMUSCULAR; INTRAVENOUS; SUBCUTANEOUS
Status: DISCONTINUED | OUTPATIENT
Start: 2017-08-03 | End: 2017-08-05 | Stop reason: HOSPADM

## 2017-08-03 RX ORDER — PENICILLIN G POTASSIUM 5000000 [IU]/1
5 INJECTION, POWDER, FOR SOLUTION INTRAMUSCULAR; INTRAVENOUS ONCE
Status: COMPLETED | OUTPATIENT
Start: 2017-08-03 | End: 2017-08-03

## 2017-08-03 RX ORDER — LANOLIN 100 %
OINTMENT (GRAM) TOPICAL
Status: DISCONTINUED | OUTPATIENT
Start: 2017-08-03 | End: 2017-08-05 | Stop reason: HOSPADM

## 2017-08-03 RX ORDER — HYDROCODONE BITARTRATE AND ACETAMINOPHEN 5; 325 MG/1; MG/1
1-2 TABLET ORAL EVERY 4 HOURS PRN
Status: DISCONTINUED | OUTPATIENT
Start: 2017-08-03 | End: 2017-08-05 | Stop reason: HOSPADM

## 2017-08-03 RX ORDER — CARBOPROST TROMETHAMINE 250 UG/ML
250 INJECTION, SOLUTION INTRAMUSCULAR
Status: DISCONTINUED | OUTPATIENT
Start: 2017-08-03 | End: 2017-08-03

## 2017-08-03 RX ORDER — LIDOCAINE HYDROCHLORIDE AND EPINEPHRINE 15; 5 MG/ML; UG/ML
3 INJECTION, SOLUTION EPIDURAL
Status: DISCONTINUED | OUTPATIENT
Start: 2017-08-03 | End: 2017-08-05 | Stop reason: HOSPADM

## 2017-08-03 RX ORDER — ROPIVACAINE HYDROCHLORIDE 2 MG/ML
10 INJECTION, SOLUTION EPIDURAL; INFILTRATION; PERINEURAL ONCE
Status: COMPLETED | OUTPATIENT
Start: 2017-08-03 | End: 2017-08-03

## 2017-08-03 RX ORDER — OXYTOCIN/0.9 % SODIUM CHLORIDE 30/500 ML
340 PLASTIC BAG, INJECTION (ML) INTRAVENOUS CONTINUOUS PRN
Status: DISCONTINUED | OUTPATIENT
Start: 2017-08-03 | End: 2017-08-05 | Stop reason: HOSPADM

## 2017-08-03 RX ORDER — SODIUM CHLORIDE, SODIUM LACTATE, POTASSIUM CHLORIDE, CALCIUM CHLORIDE 600; 310; 30; 20 MG/100ML; MG/100ML; MG/100ML; MG/100ML
INJECTION, SOLUTION INTRAVENOUS CONTINUOUS
Status: DISCONTINUED | OUTPATIENT
Start: 2017-08-03 | End: 2017-08-03

## 2017-08-03 RX ORDER — LIDOCAINE 40 MG/G
CREAM TOPICAL
Status: DISCONTINUED | OUTPATIENT
Start: 2017-08-03 | End: 2017-08-03

## 2017-08-03 RX ORDER — OXYTOCIN/0.9 % SODIUM CHLORIDE 30/500 ML
100-340 PLASTIC BAG, INJECTION (ML) INTRAVENOUS CONTINUOUS PRN
Status: COMPLETED | OUTPATIENT
Start: 2017-08-03 | End: 2017-08-03

## 2017-08-03 RX ORDER — LIDOCAINE HYDROCHLORIDE AND EPINEPHRINE 15; 5 MG/ML; UG/ML
INJECTION, SOLUTION EPIDURAL PRN
Status: DISCONTINUED | OUTPATIENT
Start: 2017-08-03 | End: 2017-08-04 | Stop reason: HOSPADM

## 2017-08-03 RX ORDER — METHYLERGONOVINE MALEATE 0.2 MG/ML
200 INJECTION INTRAVENOUS
Status: DISCONTINUED | OUTPATIENT
Start: 2017-08-03 | End: 2017-08-03

## 2017-08-03 RX ORDER — OXYTOCIN/0.9 % SODIUM CHLORIDE 30/500 ML
1-24 PLASTIC BAG, INJECTION (ML) INTRAVENOUS CONTINUOUS
Status: DISCONTINUED | OUTPATIENT
Start: 2017-08-03 | End: 2017-08-03

## 2017-08-03 RX ORDER — FENTANYL CITRATE 50 UG/ML
100 INJECTION, SOLUTION INTRAMUSCULAR; INTRAVENOUS ONCE
Status: COMPLETED | OUTPATIENT
Start: 2017-08-03 | End: 2017-08-03

## 2017-08-03 RX ORDER — ACETAMINOPHEN 325 MG/1
650 TABLET ORAL EVERY 4 HOURS PRN
Status: DISCONTINUED | OUTPATIENT
Start: 2017-08-03 | End: 2017-08-05 | Stop reason: HOSPADM

## 2017-08-03 RX ORDER — OXYTOCIN 10 [USP'U]/ML
10 INJECTION, SOLUTION INTRAMUSCULAR; INTRAVENOUS
Status: DISCONTINUED | OUTPATIENT
Start: 2017-08-03 | End: 2017-08-03

## 2017-08-03 RX ORDER — ONDANSETRON 2 MG/ML
4 INJECTION INTRAMUSCULAR; INTRAVENOUS EVERY 6 HOURS PRN
Status: DISCONTINUED | OUTPATIENT
Start: 2017-08-03 | End: 2017-08-03

## 2017-08-03 RX ORDER — OXYTOCIN 10 [USP'U]/ML
10 INJECTION, SOLUTION INTRAMUSCULAR; INTRAVENOUS
Status: DISCONTINUED | OUTPATIENT
Start: 2017-08-03 | End: 2017-08-05 | Stop reason: HOSPADM

## 2017-08-03 RX ORDER — NALBUPHINE HYDROCHLORIDE 10 MG/ML
2.5-5 INJECTION, SOLUTION INTRAMUSCULAR; INTRAVENOUS; SUBCUTANEOUS EVERY 6 HOURS PRN
Status: DISCONTINUED | OUTPATIENT
Start: 2017-08-03 | End: 2017-08-05 | Stop reason: HOSPADM

## 2017-08-03 RX ORDER — ACETAMINOPHEN 325 MG/1
650 TABLET ORAL EVERY 4 HOURS PRN
Status: DISCONTINUED | OUTPATIENT
Start: 2017-08-03 | End: 2017-08-03

## 2017-08-03 RX ORDER — NALOXONE HYDROCHLORIDE 0.4 MG/ML
.1-.4 INJECTION, SOLUTION INTRAMUSCULAR; INTRAVENOUS; SUBCUTANEOUS
Status: DISCONTINUED | OUTPATIENT
Start: 2017-08-03 | End: 2017-08-03

## 2017-08-03 RX ORDER — FENTANYL CITRATE 50 UG/ML
50-100 INJECTION, SOLUTION INTRAMUSCULAR; INTRAVENOUS
Status: DISCONTINUED | OUTPATIENT
Start: 2017-08-03 | End: 2017-08-03

## 2017-08-03 RX ORDER — METHYLERGONOVINE MALEATE 0.2 MG/ML
200 INJECTION INTRAVENOUS
Status: DISCONTINUED | OUTPATIENT
Start: 2017-08-03 | End: 2017-08-05 | Stop reason: HOSPADM

## 2017-08-03 RX ORDER — MISOPROSTOL 200 UG/1
800 TABLET ORAL
Status: DISCONTINUED | OUTPATIENT
Start: 2017-08-03 | End: 2017-08-05 | Stop reason: HOSPADM

## 2017-08-03 RX ORDER — OXYCODONE AND ACETAMINOPHEN 5; 325 MG/1; MG/1
1 TABLET ORAL
Status: DISCONTINUED | OUTPATIENT
Start: 2017-08-03 | End: 2017-08-03

## 2017-08-03 RX ORDER — AMOXICILLIN 250 MG
1-2 CAPSULE ORAL 2 TIMES DAILY
Status: DISCONTINUED | OUTPATIENT
Start: 2017-08-03 | End: 2017-08-05 | Stop reason: HOSPADM

## 2017-08-03 RX ADMIN — ROPIVACAINE HYDROCHLORIDE 9 ML: 2 INJECTION, SOLUTION EPIDURAL; INFILTRATION at 19:09

## 2017-08-03 RX ADMIN — LIDOCAINE HYDROCHLORIDE AND EPINEPHRINE 3 ML: 15; 5 INJECTION, SOLUTION EPIDURAL at 19:05

## 2017-08-03 RX ADMIN — Medication 12 ML/HR: at 19:12

## 2017-08-03 RX ADMIN — OXYTOCIN-SODIUM CHLORIDE 0.9% IV SOLN 30 UNIT/500ML 2 MILLI-UNITS/MIN: 30-0.9/5 SOLUTION at 16:22

## 2017-08-03 RX ADMIN — SODIUM CHLORIDE 2.5 MILLION UNITS: 9 INJECTION, SOLUTION INTRAVENOUS at 14:02

## 2017-08-03 RX ADMIN — IBUPROFEN 600 MG: 600 TABLET ORAL at 23:04

## 2017-08-03 RX ADMIN — SODIUM CHLORIDE, POTASSIUM CHLORIDE, SODIUM LACTATE AND CALCIUM CHLORIDE: 600; 310; 30; 20 INJECTION, SOLUTION INTRAVENOUS at 10:00

## 2017-08-03 RX ADMIN — OXYTOCIN-SODIUM CHLORIDE 0.9% IV SOLN 30 UNIT/500ML 340 ML/HR: 30-0.9/5 SOLUTION at 20:23

## 2017-08-03 RX ADMIN — SODIUM CHLORIDE, POTASSIUM CHLORIDE, SODIUM LACTATE AND CALCIUM CHLORIDE 1000 ML: 600; 310; 30; 20 INJECTION, SOLUTION INTRAVENOUS at 18:46

## 2017-08-03 RX ADMIN — ACETAMINOPHEN 650 MG: 325 TABLET, FILM COATED ORAL at 23:04

## 2017-08-03 RX ADMIN — SODIUM CHLORIDE 2.5 MILLION UNITS: 9 INJECTION, SOLUTION INTRAVENOUS at 17:53

## 2017-08-03 RX ADMIN — FENTANYL CITRATE 100 MCG: 50 INJECTION, SOLUTION INTRAMUSCULAR; INTRAVENOUS at 19:09

## 2017-08-03 RX ADMIN — PENICILLIN G POTASSIUM 5 MILLION UNITS: 5000000 POWDER, FOR SOLUTION INTRAMUSCULAR; INTRAPLEURAL; INTRATHECAL; INTRAVENOUS at 10:12

## 2017-08-03 NOTE — PLAN OF CARE
At 0930, Pt admitted to 214 for scheduled induction.  PT and spouse oriented to room and monitors applied after obtaining verbal consent.  All questions answered.  IV started and PCN began at 1010 for GBS+.

## 2017-08-03 NOTE — PROGRESS NOTES
"Labor Progress Note    S: Comfortable, not feeling any contractions    O:   Vitals:    17 0930 17 1002 17 1230 17 1320   BP: 124/74  128/80 117/70   Pulse: 87  88 81   Resp: 18  16 16   Temp: 97.1  F (36.2  C)  97.4  F (36.3  C) 98  F (36.7  C)   TempSrc: Temporal  Temporal Temporal   Weight:  241 lb (109.3 kg)     Height:  5' 5\" (1.651 m)       /70  Pulse 81  Temp 98  F (36.7  C) (Temporal)  Resp 16  Ht 5' 5\" (1.651 m)  Wt 241 lb (109.3 kg)  LMP  (LMP Unknown)  BMI 40.1 kg/m2    Gen: AOx3, NAD    SVE: 5/60/-1, soft, mid  AROM, small fluid    FHT: Cat 1   [place]: none      A/P: 20yo  at 39+5wk elective IOL  -GBS positive. On PCN.  -AROM for IOL      Amaya Mauro Masters, DO  August 3, 2017  "

## 2017-08-03 NOTE — MR AVS SNAPSHOT
"              After Visit Summary   8/3/2017    Jazlyn Ann    MRN: [id number]           Patient Information     Date Of Birth          1995        Visit Information        Provider Department      8/3/2017 8:15 AM Amaya Restrepo,  [facility]        Today's Diagnoses     Supervision of normal IUP (intrauterine pregnancy) in multigravida, third trimester           Follow-ups after your visit        Who to contact     If you have questions or need follow up information about today's clinic visit or your schedule please contact [facility] directly at [phone].  Normal or non-critical lab and imaging results will be communicated to you by mSpothart, letter or phone within 4 business days after the clinic has received the results. If you do not hear from us within 7 days, please contact the clinic through mSpothart or phone. If you have a critical or abnormal lab result, we will notify you by phone as soon as possible.  Submit refill requests through [employer] or call your pharmacy and they will forward the refill request to us. Please allow 3 business days for your refill to be completed.          Additional Information About Your Visit        MyChart Information     [employer] lets you send messages to your doctor, view your test results, renew your prescriptions, schedule appointments and more. To sign up, go to [email or web address] . Click on \"Log in\" on the left side of the screen, which will take you to the Welcome page. Then click on \"Sign up Now\" on the right side of the page.     You will be asked to enter the access code listed below, as well as some personal information. Please follow the directions to create your username and password.     Your access code is: [email or web address]  Expires: 2017  2:45 PM     Your access code will  in 90 days. If you need help or a new code, please call your [facility] or [phone].        Care EveryWhere ID  "    This is your Care EveryWhere ID. This could be used by other organizations to access your [place] medical records  [id number]        Your Vitals Were     Last Period BMI (Body Mass Index)                (LMP Unknown) 39.06 kg/m2           Blood Pressure from Last 3 Encounters:   08/03/17 112/72   07/31/17 112/62   07/31/17 117/61    Weight from Last 3 Encounters:   08/03/17 242 lb (109.8 kg)   07/31/17 245 lb (111.1 kg)   07/31/17 242 lb (109.8 kg)              Today, you had the following     No orders found for display       Primary Care Provider Office Phone # Fax #    [facility] [phone]       [address] [place] [address]        Equal Access to Services     CASANDRA SOSA : Joanne Serrano, wakelly crowderqsandra, qaybta kaalmamarco sanderson, susie munoz. So [facility] [phone].    ATENCIÓN: Si habla español, tiene a ferrell disposición servicios gratuitos de asistencia lingüística. Llame al [phone].    We comply with applicable federal civil rights laws and [place] laws. We do not discriminate on the basis of race, color, national origin, age, disability sex, sexual orientation or gender identity.            Thank you!     Thank you for choosing [facility] [place]  for your care. Our goal is always to provide you with excellent care. Hearing back from our patients is one way we can continue to improve our services. Please take a few minutes to complete the written survey that you may receive in the mail after your visit with us. Thank you!             Your Updated Medication List - Protect others around you: Learn how to safely use, store and throw away your medicines at www.disposemymeds.org.          This list is accurate as of: 8/3/17  8:41 AM.  Always use your most recent med list.                   Brand Name Dispense Instructions for use Diagnosis    prenatal multivitamin  plus iron 27-0.8 MG Tabs per tablet      100 tablet    Take 1 tablet by mouth daily    Supervision of normal intrauterine pregnancy in multigravida, third trimester       TYLENOL PO      Take 1,000 mg by mouth every 8 hours as needed for mild pain or fever

## 2017-08-03 NOTE — IP AVS SNAPSHOT
MRN:[id number]                      After Visit Summary   8/3/2017    Jazlyn Ch    MRN: [id number]           Thank you!     Thank you for choosing [facility] for your care. Our goal is always to provide you with excellent care. Hearing back from our patients is one way we can continue to improve our services. Please take a few minutes to complete the written survey that you may receive in the mail after you visit with us. Thank you!        Patient Information     Date Of Birth          1995        About your hospital stay     You were admitted on:  August 3, 2017 You last received care in the:  [address]    You were discharged on:  August 5, 2017        Reason for your hospital stay       Delivery of baby                  Who to Call     For medical emergencies, please call 911.  For non-urgent questions about your medical care, please call your primary care provider or clinic, [phone]          Attending Provider     Provider Specialty    Amaya Restrepo, DO OB/Gyn       Primary Care Provider Office Phone # Fax #    [facility] [phone]      After Care Instructions     Activity       Your activity upon discharge: activity as tolerated and no driving for today            Diet       Follow this diet upon discharge: Regular                  Follow-up Appointments     Follow-up and recommended labs and tests        Follow up with Dr Restrepo for post partum appt in 2 wks                  Further instructions from your care team       Postpartum Vaginal Delivery Instructions    Activity       Ask family and friends for help when you need it.    Do not place anything in your vagina for 6 weeks.    You are not restricted on other activities, but take it easy for a few weeks to allow your body to recover from delivery.  You are able to do any activities you feel up to that point.    No driving until you have stopped  taking your pain medications (usually two weeks after delivery).     Call your health care provider if you have any of these symptoms:       Increased pain, swelling, redness, or fluid around your stiches from an episiotomy or perineal tear.    A fever above 100.4 F (38 C) with or without chills when placing a thermometer under your tongue.    You soak a sanitary pad with blood within 1 hour, or you see blood clots larger than a golf ball.    Bleeding that lasts more than 6 weeks.    Vaginal discharge that smells bad.    Severe pain, cramping or tenderness in your lower belly area.    A need to urinate more frequently (use the toilet more often), more urgently (use the toilet very quickly), or it burns when you urinate.    Nausea and vomiting.    Redness, swelling or pain around a vein in your leg.    Problems breastfeeding or a red or painful area on your breast.    Chest pain and cough or are gasping for air.    Problems coping with sadness, anxiety, or depression.  If you have any concerns about hurting yourself or the baby, call your provider immediately.     You have questions or concerns after you return home.     Keep your hands clean:  Always wash your hands before touching your perineal area and stitches.  This helps reduce your risk of infection.  If your hands aren't dirty, you may use an alcohol hand-rub to clean your hands. Keep your nails clean and short.    DISCHARGE INSTRUCTIONS    Medications:  -May take Ibuprofen (800mg by mouth every 8 hours as needed for pain) or tylenol (500mg by mouth every 6 hours as needed for pain; max 4000mg per day) when at home as needed for pain/cramps/headaches/pain, follow instructions on bottle.  -You may use miralax (daily) or docusate (one tab by mouth twice daily) for stool softening, these are over the counter and can be found at any pharmacy. Follow bottle instructions.  -Continue prenatal vitamins.     Activity:  -Pelvic rest (no sex, tampons) for 6 weeks.  "  -General activity as tolerated, slowing increase daily. Avoid vigorous exercise, jumping or strength training for at least 4-6 weeks.  You may drive when you are able to safely operate a motor vehicle and are no longer taking narcotic medications if they have been prescribed for you.    Precuations:  -Call doctor if heavy unexpected bleeding, fever of 100.4 or greater, foul vaginal discharge, severe abdominal pain not helped with medications or for any other concerns or questions. If you are having headaches not resolved by tylenol or ibuprofen, new or different vision changes then notify your doctor.    Follow Up Visit:  -Call the [facility] to schedule your 2 week early PP visit to discuss/schedule contraception and then a 6 week postpartum appointment, [phone].             Pending Results     Date and Time Order Name Status Description    8/3/2017 2054 Placenta path order and indications In process             Statement of Approval     Ordered          08/05/17 0814  I have reviewed and agree with all the recommendations and orders detailed in this document.  EFFECTIVE NOW     Approved and electronically signed by:  Ashia Yates MD             Admission Information     Date & Time Provider Department Dept. Phone    8/3/2017 Masters, Amaya Mauro DO [address] [phone]      Your Vitals Were     Blood Pressure Pulse Temperature Respirations Height Weight    114/65 86 98.1  F (36.7  C) (Oral) 18 1.651 m (5' 5\") 109.3 kg (241 lb)    Last Period Pulse Oximetry BMI (Body Mass Index)             (LMP Unknown) 99% 40.1 kg/m2         [facility] Information     [facility] lets you send messages to your doctor, view your test results, renew your prescriptions, schedule appointments and more. To sign up, go to [email or web address] . Click on \"Log in\" on the left side of the screen, which will take you to the Welcome page. Then click on \"Sign up Now\" on the right " side of the page.     You will be asked to enter the access code listed below, as well as some personal information. Please follow the directions to create your username and password.     Your access code is: [email or web address]  Expires: 2017  2:45 PM     Your access code will  in 90 days. If you need help or a new code, please call your [place] clinic or [phone].        Care EveryWhere ID     This is your Care EveryWhere ID. This could be used by other organizations to access your [place] medical records  [id number]        Equal Access to Services     [facility]: Haddayami Serrano, wakelly sow, jackelyn sanderson, susie grajeda . So [facility] [phone].    ATENCIÓN: Si habla español, tiene a ferrell disposición servicios gratuitos de asistencia lingüística. Llame al [phone].    We comply with applicable federal civil rights laws and [place] laws. We do not discriminate on the basis of race, color, national origin, age, disability sex, sexual orientation or gender identity.               Review of your medicines      CONTINUE these medicines which have NOT CHANGED        Dose / Directions    prenatal multivitamin plus iron 27-0.8 MG Tabs per tablet   Used for:  Supervision of normal intrauterine pregnancy in multigravida, third trimester        Dose:  1 tablet   Take 1 tablet by mouth daily   Quantity:  100 tablet   Refills:  11       TYLENOL PO        Dose:  1000 mg   Take 1,000 mg by mouth every 8 hours as needed for mild pain or fever   Refills:  0                Protect others around you: Learn how to safely use, store and throw away your medicines at www.disposemymeds.org.             Medication List: This is a list of all your medications and when to take them. Check marks below indicate your daily home schedule. Keep this list as a reference.      Medications           Morning Afternoon Evening Bedtime As Needed    prenatal multivitamin plus  iron 27-0.8 MG Tabs per tablet   Take 1 tablet by mouth daily                                TYLENOL PO   Take 1,000 mg by mouth every 8 hours as needed for mild pain or fever   Last time this was given:  650 mg on 8/4/2017  1:39 PM

## 2017-08-03 NOTE — IP AVS SNAPSHOT
[address]    [place] [address]    Phone:  [phone]                                       After Visit Summary   8/3/2017    Jazlyn Ch    MRN: [id number]           After Visit Summary Signature Page     I have received my discharge instructions, and my questions have been answered. I have discussed any challenges I see with this plan with the nurse or doctor.    ..........................................................................................................................................  Patient/Patient Representative Signature      ..........................................................................................................................................  Patient Representative Print Name and Relationship to Patient    ..................................................               ................................................  Date                                            Time    ..........................................................................................................................................  Reviewed by Signature/Title    ...................................................              ..............................................  Date                                                            Time

## 2017-08-03 NOTE — PROGRESS NOTES
No loss of fluid/vaginal bleeding/regular contractions. + FM  -Thomas 8  -Pt desires IOL due to distance from hospital and fast labors reported in past with GBS positive status and her concern for adequate tx. Scheduled for IOL at 9. Will start pitocin and plan AROM over lunch time.    Amaya Mauro Masters, DO

## 2017-08-04 ENCOUNTER — APPOINTMENT (OUTPATIENT)
Dept: GENERAL RADIOLOGY | Facility: CLINIC | Age: 22
End: 2017-08-04
Attending: OBSTETRICS & GYNECOLOGY
Payer: COMMERCIAL

## 2017-08-04 LAB
HGB BLD-MCNC: 11.6 G/DL (ref 11.7–15.7)
T PALLIDUM IGG+IGM SER QL: NEGATIVE

## 2017-08-04 PROCEDURE — 85018 HEMOGLOBIN: CPT | Performed by: OBSTETRICS & GYNECOLOGY

## 2017-08-04 PROCEDURE — 36415 COLL VENOUS BLD VENIPUNCTURE: CPT | Performed by: OBSTETRICS & GYNECOLOGY

## 2017-08-04 PROCEDURE — 12000037 ZZH R&B POSTPARTUM INTERMEDIATE

## 2017-08-04 PROCEDURE — 74000 XR ABDOMEN 1 VW: CPT

## 2017-08-04 PROCEDURE — 25000132 ZZH RX MED GY IP 250 OP 250 PS 637: Performed by: OBSTETRICS & GYNECOLOGY

## 2017-08-04 RX ADMIN — IBUPROFEN 600 MG: 600 TABLET ORAL at 13:39

## 2017-08-04 RX ADMIN — IBUPROFEN 600 MG: 600 TABLET ORAL at 19:41

## 2017-08-04 RX ADMIN — SENNOSIDES AND DOCUSATE SODIUM 1 TABLET: 8.6; 5 TABLET ORAL at 08:15

## 2017-08-04 RX ADMIN — HYDROCODONE BITARTRATE AND ACETAMINOPHEN 1 TABLET: 5; 325 TABLET ORAL at 17:08

## 2017-08-04 RX ADMIN — IBUPROFEN 600 MG: 600 TABLET ORAL at 08:15

## 2017-08-04 RX ADMIN — ACETAMINOPHEN 650 MG: 325 TABLET, FILM COATED ORAL at 13:39

## 2017-08-04 RX ADMIN — ACETAMINOPHEN 650 MG: 325 TABLET, FILM COATED ORAL at 03:15

## 2017-08-04 RX ADMIN — SENNOSIDES AND DOCUSATE SODIUM 1 TABLET: 8.6; 5 TABLET ORAL at 19:41

## 2017-08-04 RX ADMIN — ACETAMINOPHEN 650 MG: 325 TABLET, FILM COATED ORAL at 08:15

## 2017-08-04 RX ADMIN — IBUPROFEN 600 MG: 600 TABLET ORAL at 03:16

## 2017-08-04 RX ADMIN — HYDROCODONE BITARTRATE AND ACETAMINOPHEN 1 TABLET: 5; 325 TABLET ORAL at 21:16

## 2017-08-04 NOTE — PLAN OF CARE
Patient and infant transferred to room 409 via wheelchair accompanied by nurse. Belongings transferred alongside as well. Bedside report received from Tiffany MCGREGOR RN. Safety protocols reviewed with patient. ID bands verified. Patient oriented to call light, room, and plan of care. Will continue to monitor.

## 2017-08-04 NOTE — PROGRESS NOTES
Obstetrics Postpartum Rounding Note, PPD#1    S: Doing well. Pain controlled. Breast feeding. Minimal lochia. Wants to know when she can get her tubes tied, does not want more kids.  wants kids though.      O:   Vitals:    08/03/17 2145 08/03/17 2210 08/03/17 2225 08/04/17 0030   BP: 128/66 125/58 112/56 101/55   Pulse:       Resp: 16   18   Temp:    98.2  F (36.8  C)   TempSrc:    Oral   SpO2:    99%   Weight:       Height:           Gen: AOx3, NAD  Abd: soft, ND, non ttp, FF@ U-1.  Ext: no calf ttp, no edema    Labs:         Hemoglobin   Date Value Ref Range Status   08/03/2017 11.1 (L) 11.7 - 15.7 g/dL Final   06/27/2017 10.4 (L) 11.7 - 15.7 g/dL Final            Lab Results   Component Value Date    RH  Pos 08/03/2017       Meds:  Current Facility-Administered Medications   Medication     medication instruction     lactated ringers BOLUS 250 mL     ePHEDrine injection 5 mg     nalbuphine (NUBAIN) injection 2.5-5 mg     naloxone (NARCAN) injection 0.1-0.4 mg     medication instruction     Opioid plan postpartum - medication instruction     lactated ringers BOLUS 1,000 mL     lidocaine-EPINEPHrine 1.5 %-1:[id number] injection 3 mL     fentaNYL (SUBLIMAZE) 2 mcg/mL, ropivacaine (NAROPIN) 0.2% in NaCl 0.9% EPIDURAL infusion     oxytocin (PITOCIN) 30 units in 500 mL 0.9% NaCl infusion     ibuprofen (ADVIL/MOTRIN) tablet 600 mg     acetaminophen (TYLENOL) tablet 650 mg     naloxone (NARCAN) injection 0.1-0.4 mg     senna-docusate (SENOKOT-S;PERICOLACE) 8.6-50 MG per tablet 1-2 tablet     [START ON 8/5/2017] bisacodyl (DULCOLAX) Suppository 10 mg     [START ON 8/5/2017] sodium phosphate (FLEET ENEMA) 1 enema     hydrocortisone 2.5 % cream     lanolin ointment     lactated ringers BOLUS 1,000 mL     oxytocin (PITOCIN) 30 units in 500 mL 0.9% NaCl infusion     oxytocin (PITOCIN) injection 10 Units     NO Rho (D) immune globulin (RhoGam) needed - mother Rh POSITIVE     HYDROcodone-acetaminophen (NORCO) 5-325 MG per  tablet 1-2 tablet     No MMR Needed - Assessment: Patient does not need MMR vaccine     No Tdap Needed - Assessment: Patient does not need Tdap vaccine     methylergonovine (METHERGINE) injection 200 mcg     misoprostol (CYTOTEC) tablet 800 mcg     Facility-Administered Medications Ordered in Other Encounters   Medication     lidocaine-EPINEPHrine 1.5 %-1:489011 injection       A/P: PPD#1 s/p  doing well.  - Desires permanent contraception. Will have her f/u in clinic in 2wks to sign papers, discuss in depth, plan for procedure and can discuss other LARC as well.  -Conceived on IUD. Will get abd Xray  -Continue routine care.  -Anticipate discharge tomorrow.    mAaya Mauro Masters, DO  2017  8:06 AM

## 2017-08-04 NOTE — DISCHARGE INSTRUCTIONS
Postpartum Vaginal Delivery Instructions    Activity       Ask family and friends for help when you need it.    Do not place anything in your vagina for 6 weeks.    You are not restricted on other activities, but take it easy for a few weeks to allow your body to recover from delivery.  You are able to do any activities you feel up to that point.    No driving until you have stopped taking your pain medications (usually two weeks after delivery).     Call your health care provider if you have any of these symptoms:       Increased pain, swelling, redness, or fluid around your stiches from an episiotomy or perineal tear.    A fever above 100.4 F (38 C) with or without chills when placing a thermometer under your tongue.    You soak a sanitary pad with blood within 1 hour, or you see blood clots larger than a golf ball.    Bleeding that lasts more than 6 weeks.    Vaginal discharge that smells bad.    Severe pain, cramping or tenderness in your lower belly area.    A need to urinate more frequently (use the toilet more often), more urgently (use the toilet very quickly), or it burns when you urinate.    Nausea and vomiting.    Redness, swelling or pain around a vein in your leg.    Problems breastfeeding or a red or painful area on your breast.    Chest pain and cough or are gasping for air.    Problems coping with sadness, anxiety, or depression.  If you have any concerns about hurting yourself or the baby, call your provider immediately.     You have questions or concerns after you return home.     Keep your hands clean:  Always wash your hands before touching your perineal area and stitches.  This helps reduce your risk of infection.  If your hands aren't dirty, you may use an alcohol hand-rub to clean your hands. Keep your nails clean and short.    DISCHARGE INSTRUCTIONS    Medications:  -May take Ibuprofen (800mg by mouth every 8 hours as needed for pain) or tylenol (500mg by mouth every 6 hours as needed for  pain; max 4000mg per day) when at home as needed for pain/cramps/headaches/pain, follow instructions on bottle.  -You may use miralax (daily) or docusate (one tab by mouth twice daily) for stool softening, these are over the counter and can be found at any pharmacy. Follow bottle instructions.  -Continue prenatal vitamins.     Activity:  -Pelvic rest (no sex, tampons) for 6 weeks.   -General activity as tolerated, slowing increase daily. Avoid vigorous exercise, jumping or strength training for at least 4-6 weeks.  You may drive when you are able to safely operate a motor vehicle and are no longer taking narcotic medications if they have been prescribed for you.    Precuations:  -Call doctor if heavy unexpected bleeding, fever of 100.4 or greater, foul vaginal discharge, severe abdominal pain not helped with medications or for any other concerns or questions. If you are having headaches not resolved by tylenol or ibuprofen, new or different vision changes then notify your doctor.    Follow Up Visit:  -Call the [facility] to schedule your 2 week early PP visit to discuss/schedule contraception and then a 6 week postpartum appointment, [phone].

## 2017-08-04 NOTE — ANESTHESIA POSTPROCEDURE EVALUATION
Patient: Jazlyn Ch    * No procedures listed *    Diagnosis:* No pre-op diagnosis entered *  Diagnosis Additional Information: No value filed.    Anesthesia Type:  No value filed.    Note:  Anesthesia Post Evaluation    Patient location during evaluation: Floor  Patient participation: Able to fully participate in evaluation  Level of consciousness: awake  Pain management: adequate  Airway patency: patent  Cardiovascular status: acceptable  Respiratory status: acceptable  Hydration status: acceptable  PONV: none     Anesthetic complications: None    Comments: Uncomplicated DEYSI        Last vitals:  Vitals:    08/03/17 2225 08/04/17 0030 08/04/17 0816   BP: 112/56 101/55 131/80   Pulse:      Resp:  18 16   Temp:  36.8  C (98.2  F) 36.8  C (98.2  F)   SpO2:  99%          Electronically Signed By: Rufina Orozco MD  August 4, 2017  2:05 PM

## 2017-08-04 NOTE — PROGRESS NOTES
Data: Jazlyn Ch transferred to [facility] via wheelchair at 2240. Baby transferred via mother's arms.  Action: Receiving unit notified of transfer: Yes. Patient and family notified of room change. Report given to Ana Maria ARMANDO RN, at 2245. Belongings sent to receiving unit. Accompanied by Registered Nurse. Oriented patient to surroundings. Call light within reach. ID bands double-checked with receiving RN.  Response: Patient tolerated transfer and is stable.

## 2017-08-04 NOTE — H&P
Admission Note:    No significant change in general health status based on examination of the patient, review of Nursing Admission Database and prenatal record.  Elective IOL.  GBS positive, will plan PCN first, then AROM.  Epidural prn    EFW: 7lb 8oz    Primary OB: Masters    Amaya Mauro Masters  August 3, 2017

## 2017-08-04 NOTE — PLAN OF CARE
Problem: Goal Outcome Summary  Goal: Goal Outcome Summary  Outcome: Improving  Vital signs stable, afebrile, voided x2 SL dc'd, FF@U small rubra lochia, ice and tucks to perineum prn, patient showered, able to rest, pain well controlled with medications, rates her pain 2-3/10, breast feeding  skin-to-skin on demand. Discussed hospital policy regarding bedsharing at length with both patient and  and measures for safety for the .

## 2017-08-04 NOTE — LACTATION NOTE
Initial visit.   Breastfeeding handout given.   Advised to breastfeed exclusively, on demand, avoid pacifiers, bottles and formula unless medically indicated.  Encouraged rooming in, skin to skin, feeding on demand 8-12x/day or sooner if baby cues.  Explained benefits of holding and skin to skin.  Encouraged lots of skin to skin.   Continues to nurse well per mom. No further questions at this time.   Will follow as needed.   Joan Mauro RNC, IBCLC

## 2017-08-04 NOTE — ANESTHESIA PROCEDURE NOTES
Peripheral nerve/Neuraxial procedure note : epidural catheter  Pre-Procedure  Performed by SHAD HAAS  Location: OB      Pre-Anesthestic Checklist: patient identified, IV checked, risks and benefits discussed, informed consent and pre-op evaluation    Timeout  Correct Patient: Yes   Correct Procedure: Yes   Correct Site: Yes   Correct Laterality: N/A   Correct Position: Yes   Site Marked: N/A   .   Procedure Documentation    .    Procedure:    Epidural catheter.  Insertion Site:L3-4  (midline approach) Injection technique: LORT saline   Local skin infiltrated with mL of 1% lidocaine.  PARISH at 6 cm     Patient Prep;mask, sterile gloves, povidone-iodine 7.5% surgical scrub, patient draped.  .  Needle: ToCelatony needle Needle Gauge: 17.    Needle Length (Inches) 3.5  # of attempts: 1 and # of redirects:  .   . .  Catheter threaded easily  5 cm epidural space.  11 cm at skin.   .    Assessment/Narrative  Paresthesias: No.  .  .  Aspiration negative for heme or CSF  . Test dose of 3 mL lidocaine 1.5% w/ 1:200,000 epinephrine at. Test dose negative for signs of intravascular, subdural or intrathecal injection. Comments:  Amelia PARISH at 6 cm.  Catheter threaded easily.  Negative aspiration.  Negative test dose.  No abnormal pain or paresthesia throughout.  Patient tolerated well.

## 2017-08-04 NOTE — L&D DELIVERY NOTE
VAGINAL DELIVERY PROCEDURE NOTE    PREOPERATIVE DIAGNOSIS:  1. Intrauterine pregnancy at 39+5  2. Active labor  3. GBS positive    POSTOPERATIVE DIAGNOSIS:   1. Status post     PROCEDURE DATE: August 3, 2017    PROCEDURE:   1.     STAFF SURGEON: Amaya Restrepo DO     ANESTHESIA: Epidural    COMPLICATIONS: none    ESTIMATED BLOOD LOSS: 200 mL.     SPECIMENS: cord blood, placenta    FINDINGS:  Male infant, weight pending. Apgar scores of 8 and 9.  Delivered in JEWELS presentation. Placenta with 3  vessel cord. Meconium: absent. Nuchal cord x 1.      INDICATIONS:  This is a 21 year old  with intrauterine pregnancy at 39w5d who presented to Labor and Delivery for elective IOL. Her pregnancy has been c/b conception on IUD though none found durig pregnancy ultrasounds; depression; GBS positive.     PROCEDURE:  The patient was complete and pushing. Infant's head was delivered atraumatically over an intact perineum. Nuchal cord present x 1, loose. Anterior shoulder and posterior shoulders were easily delivered without problems followed by remainder of infant. Infant vigorous and crying. The infant placed on maternal; drying and resuscitative measures performed. Delayed cord clamping was performed prior to cutting. Cord gasses were not obtained. Cord blood was collected. Pitocin in IV fluid was administered per protocol. The placenta delivered spontaneously intact with 3 vessel cord and revealed normal anatomy with multiple nodules on placenta on fetal sice. Uterine massage was performed and the fundus was found to be firm. Vagina, cervix, and perineum were inspected for lacerations. No laceration was noted. All counts were correct. Mom and baby stable in room.    Primary OB: Lauro Restrepo DO  8:53 PM  August 3, 2017

## 2017-08-04 NOTE — ANESTHESIA PREPROCEDURE EVALUATION
Anesthesia Evaluation     .             ROS/MED HX    ENT/Pulmonary:  - neg pulmonary ROS     Neurologic:  - neg neurologic ROS     Cardiovascular:  - neg cardiovascular ROS       METS/Exercise Tolerance:     Hematologic:         Musculoskeletal:         GI/Hepatic:         Renal/Genitourinary:         Endo:         Psychiatric:         Infectious Disease:         Malignancy:         Other:                     Physical Exam  Normal systems: cardiovascular, pulmonary and dental    Airway   Mallampati: II  TM distance: > 3 FB  Neck ROM: full  Mouth opening: > 3 cm    Dental     Cardiovascular       Pulmonary             (-) no pre-eclampsia               Anesthesia Plan      History & Physical Review      ASA Status:  2 .  OB Epidural Asa: 2       Plan for Epidural          Postoperative Care      Consents  Anesthetic plan, risks, benefits and alternatives discussed with:  Patient and Patient..                          .

## 2017-08-04 NOTE — PLAN OF CARE
Problem: Goal Outcome Summary  Goal: Goal Outcome Summary  Outcome: Improving  Taking Tylenol and Ibuprofen for cramping.  Fundus is firm with no free flow or clots.  Voiding without difficulty.  Doing well with breastfeeding and baby cares.

## 2017-08-05 ENCOUNTER — NURSE TRIAGE (OUTPATIENT)
Dept: NURSING | Facility: CLINIC | Age: 22
End: 2017-08-05

## 2017-08-05 VITALS
BODY MASS INDEX: 40.15 KG/M2 | HEIGHT: 65 IN | WEIGHT: 241 LBS | OXYGEN SATURATION: 99 % | RESPIRATION RATE: 18 BRPM | HEART RATE: 86 BPM | DIASTOLIC BLOOD PRESSURE: 65 MMHG | TEMPERATURE: 98.1 F | SYSTOLIC BLOOD PRESSURE: 114 MMHG

## 2017-08-05 PROCEDURE — 25000132 ZZH RX MED GY IP 250 OP 250 PS 637: Performed by: OBSTETRICS & GYNECOLOGY

## 2017-08-05 RX ADMIN — IBUPROFEN 600 MG: 600 TABLET ORAL at 14:44

## 2017-08-05 RX ADMIN — HYDROCODONE BITARTRATE AND ACETAMINOPHEN 1 TABLET: 5; 325 TABLET ORAL at 05:25

## 2017-08-05 RX ADMIN — IBUPROFEN 600 MG: 600 TABLET ORAL at 01:16

## 2017-08-05 RX ADMIN — HYDROCODONE BITARTRATE AND ACETAMINOPHEN 1 TABLET: 5; 325 TABLET ORAL at 01:16

## 2017-08-05 RX ADMIN — ACETAMINOPHEN 650 MG: 325 TABLET, FILM COATED ORAL at 14:44

## 2017-08-05 RX ADMIN — IBUPROFEN 600 MG: 600 TABLET ORAL at 06:35

## 2017-08-05 NOTE — PLAN OF CARE
Problem: Goal Outcome Summary  Goal: Goal Outcome Summary  Outcome: Adequate for Discharge Date Met:  08/05/17  Taking Tylenol and Ibuprofen for cramping.  Voiding without difficulty.  fundus firm with no free flow or clots.  Doing well with baby cares and breastfeeding.  Will be discharged today.

## 2017-08-05 NOTE — LACTATION NOTE
Routine visit. mothe asleep at arrival of LC. Awakened and Questions answered regarding pumping and physiology of milk supply and production. Mother concerned about milk supply feels she had more milk  With past two children by this point.   Instructed to feed frequently and  keep pumping/ hand expressing.  Mother fell asleep while LC was mid sentence.  Instructed FOB to have mother call when awake.  No further questions at this time. Will follow as needed. Joan Mauro RNC, IBCLC

## 2017-08-05 NOTE — PLAN OF CARE
Problem: Goal Outcome Summary  Goal: Goal Outcome Summary  Outcome: No Change  VSS. Ibuprofen /Tylenol for uterine cramps not relieving pain. Hydrocodone one tablet given for pain with better pain control.  Ambulating in hallway, voiding adequate amounts, showered and watched PPD & Shaken baby videos.

## 2017-08-05 NOTE — LACTATION NOTE
Routine visit. Baby at breast at time of visit.  Mother is drinking mother's milk tea.  Gave information about Fenugreek and Go-Lacta. Encouraged frequent feeding and hand expression.  If able pump after feeds.  If no change in breasts by Monday morning instructed to call her pediatrician for lactation f/u.  No further questions at this time. Will follow as needed. Joan Mauro RNC, IBCLC

## 2017-08-05 NOTE — PLAN OF CARE
Problem: Goal Outcome Summary  Goal: Goal Outcome Summary  Outcome: Improving  VSS.  Breastfeeding going well.  Bottle feeding formula.  Started pumping.  Norco and Ibuprofen controlling pain.  FF @ midline.  Voiding adequately.  Will continue to monitor.

## 2017-08-06 NOTE — TELEPHONE ENCOUNTER
"  Reason for Disposition    [1] Constant abdominal pain AND [2] present > 2 hours    Additional Information    Negative: Shock suspected (e.g., cold/pale/clammy skin, too weak to stand, low BP, rapid pulse)    Negative: Difficult to awaken or acting confused  (e.g., disoriented, slurred speech)    Negative: Passed out (i.e., lost consciousness, collapsed and was not responding)    Negative: [1] Vomiting AND [2] contains red blood or black (\"coffee ground\") material  (Exception: few red streaks in vomit that only happened once)    Negative: Sounds like a life-threatening emergency to the triager    Negative: Diarrhea is main symptom    Negative: Stool color other than brown or tan is main concern  (no bleeding and no melena)    Negative: SEVERE rectal bleeding (large blood clots; on and off, or constant bleeding)    Negative: SEVERE dizziness (e.g., unable to stand, requires support to walk, feels like passing out now)    Negative: [1] MODERATE rectal bleeding (small blood clots, passing blood without stool, or toilet water turns red) AND [2] more than once a day    Negative: Pale skin (pallor) of new onset or worsening    Negative: Tarry or jet black-colored stool (not dark green)    Protocols used: RECTAL BLEEDING-ADULT-FLACO    Jazlyn says that she is 2 days postpartum and has a large hemorrhoid that is bleeding. Pt. Says that she is also passing red blood clots and a light tan fluid, from her rectum. Pt. refuses to go to the ER tonight, because she has no breast pump yet, and she has to feed her new baby.  "

## 2017-08-07 LAB — COPATH REPORT: NORMAL

## 2017-08-17 ENCOUNTER — CARE COORDINATION (OUTPATIENT)
Dept: CARE COORDINATION | Facility: CLINIC | Age: 22
End: 2017-08-17

## 2017-08-17 NOTE — PROGRESS NOTES
Clinic Care Coordination Contact  [facility]/Voicemail    Referral Source:  (OB)  Clinical Data: Care Coordinator Outreach  Outreach attempted x 1.  Left message on voicemail with call back information and requested return call.  Plan: Care Coordinator following, will assess for post partum depression and make referrals as needed. Care Coordinator will try to reach patient again in 3-5 business days.    Mily Davies [facility]  Clinic Care Coordinator-  Clinics: [place], [place], [place]  E-Mail: [email or web address]  Telephone: [phone]

## 2017-08-18 ENCOUNTER — OFFICE VISIT (OUTPATIENT)
Dept: OBGYN | Facility: CLINIC | Age: 22
End: 2017-08-18
Payer: COMMERCIAL

## 2017-08-18 DIAGNOSIS — Z30.09 GENERAL COUNSELING AND ADVICE ON CONTRACEPTIVE MANAGEMENT: Primary | ICD-10-CM

## 2017-08-18 PROCEDURE — 99213 OFFICE O/P EST LOW 20 MIN: CPT | Performed by: OBSTETRICS & GYNECOLOGY

## 2017-08-18 ASSESSMENT — ANXIETY QUESTIONNAIRES
6. BECOMING EASILY ANNOYED OR IRRITABLE: NOT AT ALL
1. FEELING NERVOUS, ANXIOUS, OR ON EDGE: NOT AT ALL
5. BEING SO RESTLESS THAT IT IS HARD TO SIT STILL: NOT AT ALL
GAD7 TOTAL SCORE: 0
3. WORRYING TOO MUCH ABOUT DIFFERENT THINGS: NOT AT ALL
2. NOT BEING ABLE TO STOP OR CONTROL WORRYING: NOT AT ALL
7. FEELING AFRAID AS IF SOMETHING AWFUL MIGHT HAPPEN: NOT AT ALL
IF YOU CHECKED OFF ANY PROBLEMS ON THIS QUESTIONNAIRE, HOW DIFFICULT HAVE THESE PROBLEMS MADE IT FOR YOU TO DO YOUR WORK, TAKE CARE OF THINGS AT HOME, OR GET ALONG WITH OTHER PEOPLE: NOT DIFFICULT AT ALL

## 2017-08-18 ASSESSMENT — PATIENT HEALTH QUESTIONNAIRE - PHQ9
5. POOR APPETITE OR OVEREATING: NOT AT ALL
SUM OF ALL RESPONSES TO PHQ QUESTIONS 1-9: 1

## 2017-08-18 NOTE — PROGRESS NOTES
SUBJECTIVE:                                                   Jazlyn Ch is a 21 year old female who presents to clinic today for the following health issue(s):  Patient presents with:  Hospital F/U: patient had a vaginal delivery on 8/3/17 and needs return to work note for .     HPI:  Here to discuss contraception.Has decided she does not want to do BTL. She doesn't want more kids as she has 3 now, but her current  only has this most recent one. She doesn't want to be unfair to him  Had IUD placed  following a termination. Did have her f/u afterward. Has always had lighter, irregular periods. Not sure when it would have fallen out.   Had neg Abd Xray PP and none seen intraabdominally.  Tried OCPs when she was younger.  Does not thinks she can take them now reliably. Does nto want to do depo    No LMP recorded (lmp unknown)..   Patient is not sexually active, .  Using none for contraception.    reports that she has never smoked. She has never used smokeless tobacco.    Today's PHQ-2 Score: PHQ-2 (  [employer]) 2015   Q1: Little interest or pleasure in doing things 0   Q2: Feeling down, depressed or hopeless 0   PHQ-2 Score 0     Today's PHQ-9 Score:   PHQ-9 SCORE 2017   Total Score 1     Today's JAG-7 Score:   JAG-7 SCORE 2017   Total Score 0       Problem list and histories reviewed & adjusted, as indicated.  Additional history: as documented.    Patient Active Problem List   Diagnosis     Fall     Supervision of normal IUP (intrauterine pregnancy) in multigravida     Subchorionic hemorrhage in first trimester     Positive GBS test     Encounter for triage in pregnant patient     Indication for care in labor or delivery      (spontaneous vaginal delivery)     Past Surgical History:   Procedure Laterality Date     NO HISTORY OF SURGERY        Social History   Substance Use Topics     Smoking status: Never Smoker     Smokeless tobacco: Never Used     Alcohol use Yes       Problem (# of Occurrences) Relation (Name,Age of Onset)    DIABETES (1) Maternal Grandmother: Type II doabetis            Current Outpatient Prescriptions   Medication Sig     Acetaminophen (TYLENOL PO) Take 1,000 mg by mouth every 8 hours as needed for mild pain or fever     Prenatal Vit-Fe Fumarate-FA (PRENATAL MULTIVITAMIN  PLUS IRON) 27-0.8 MG TABS per tablet Take 1 tablet by mouth daily     No current facility-administered medications for this visit.      Allergies   Allergen Reactions     Banana      anaphyaxis       Seasonal Allergies        ROS:  12 point review of systems negative other than symptoms noted below.    OBJECTIVE:     LMP  (LMP Unknown)  There is no height or weight on file to calculate BMI.    Exam:  Constitutional:  Appearance: Well nourished, well developed alert, in no acute distress  Neck:  Lymph Nodes:  No lymphadenopathy present; Thyroid:  Gland size normal, nontender, no nodules or masses present on palpation  Chest:  Respiratory Effort:  Breathing unlabored  Cardiovascular: no edema  Neurologic/Psychiatric:  Mental Status:  Oriented X3      ASSESSMENT/PLAN:                                                        ICD-10-CM    1. General counseling and advice on contraceptive management Z30.09          -Does not want to do BTL. Reviewed long term contraceptive options as does not want pills.  Wants to do IUD. Discussed risks/benefits. Abstinence until 6wk visit  Discussed adding couple extra visits to determine retention. She states she can try to use condoms for first couple months  -Letters to return to work and to document membrane stripping given.  -F/U 4wk for routine PP visit.       Amaya Mauro Masters, DO  [facility] FOR [facility]

## 2017-08-18 NOTE — LETTER
[address] [address]  Phone: [phone]  Fax: [phone]    August 18, 2017        Jazlyn Ch  [facility] [facility] [address]          To whom it may concern:    RE: Jazlyn Ch    Patient was seen and treated at our clinic 7/31/17. Her membranes were stripped at that appointment.    Please contact me for questions or concerns.      Sincerely,        Amaya Mauro Masters, DO

## 2017-08-18 NOTE — MR AVS SNAPSHOT
"              After Visit Summary   2017    Jazlyn Ch    MRN: [id number]           Patient Information     Date Of Birth          1995        Visit Information        Provider Department      2017 3:30 PM Amaya Restrepo,  [facility] [place]        Today's Diagnoses     General counseling and advice on contraceptive management    -  1       Follow-ups after your visit        Follow-up notes from your care team     Return in about 4 weeks (around 9/15/2017).      Who to contact     If you have questions or need follow up information about today's clinic visit or your schedule please contact [facility] directly at [phone].  Normal or non-critical lab and imaging results will be communicated to you by MyChart, letter or phone within 4 business days after the clinic has received the results. If you do not hear from us within 7 days, please contact the clinic through MyChart or phone. If you have a critical or abnormal lab result, we will notify you by phone as soon as possible.  Submit refill requests through [employer] or call your pharmacy and they will forward the refill request to us. Please allow 3 business days for your refill to be completed.          Additional Information About Your Visit        MyChart Information     [employer] lets you send messages to your doctor, view your test results, renew your prescriptions, schedule appointments and more. To sign up, go to [email or web address] . Click on \"Log in\" on the left side of the screen, which will take you to the Welcome page. Then click on \"Sign up Now\" on the right side of the page.     You will be asked to enter the access code listed below, as well as some personal information. Please follow the directions to create your username and password.     Your access code is: [email or web address]  Expires: 2017  2:45 PM     Your access code will  in 90 days. If you need help or a new code, " please call your [facility] clinic or [phone].        Care EveryWhere ID     This is your Care EveryWhere ID. This could be used by other organizations to access your [facility] medical records  [id number]        Your Vitals Were     Last Period                   (LMP Unknown)            Blood Pressure from Last 3 Encounters:   08/05/17 114/65   08/03/17 112/72   07/31/17 112/62    Weight from Last 3 Encounters:   08/03/17 241 lb (109.3 kg)   08/03/17 242 lb (109.8 kg)   07/31/17 245 lb (111.1 kg)              Today, you had the following     No orders found for display       Primary Care Provider Office Phone # Fax #    [facility] [phone]       [address] [place] [address]        Equal Access to Services     CASANDRA SOSA : Joanne Serrano, wakelly luqsandra, qaybkaren kaalmamarco sanderson, susie munoz. So [facility] [phone].    ATENCIÓN: Si habla español, tiene a ferrell disposición servicios gratuitos de asistencia lingüística. Maya al [phone].    We comply with applicable federal civil rights laws and [place] laws. We do not discriminate on the basis of race, color, national origin, age, disability sex, sexual orientation or gender identity.            Thank you!     Thank you for choosing [facility] FOR [facility]  for your care. Our goal is always to provide you with excellent care. Hearing back from our patients is one way we can continue to improve our services. Please take a few minutes to complete the written survey that you may receive in the mail after your visit with us. Thank you!             Your Updated Medication List - Protect others around you: Learn how to safely use, store and throw away your medicines at www.disposemymeds.org.          This list is accurate as of: 8/18/17 11:59 PM.  Always use your most recent med list.                   Brand Name Dispense Instructions for use Diagnosis    prenatal  multivitamin plus iron 27-0.8 MG Tabs per tablet     100 tablet    Take 1 tablet by mouth daily    Supervision of normal intrauterine pregnancy in multigravida, third trimester       TYLENOL PO      Take 1,000 mg by mouth every 8 hours as needed for mild pain or fever

## 2017-08-18 NOTE — LETTER
[address] [address]  Phone: [phone]  Fax: [phone]    August 18, 2017        Jazlyn Ch  [facility] [place] [place] [facility] [address]          To whom it may concern:    RE: Jazlyn Ch    Patient was seen and treated today at our clinic. She is approved to go back to work starting 8/22/17.    Please contact me for questions or concerns.      Sincerely,        Amaya Mauro Masters, DO

## 2017-08-19 ASSESSMENT — ANXIETY QUESTIONNAIRES: GAD7 TOTAL SCORE: 0

## 2017-09-15 ENCOUNTER — CARE COORDINATION (OUTPATIENT)
Dept: CARE COORDINATION | Facility: CLINIC | Age: 22
End: 2017-09-15

## 2017-09-15 NOTE — PROGRESS NOTES
Clinic Care Coordination Contact  OUTREACH    Referral Information:  Referral Source:  (OB)  Reason for Contact:follow up  Care Conference: No     Universal Utilization:   ED Visits in last year: 1  Hospital visits in last year: 3  Last PCP appointment: 08/03/17  Missed Appointments:  (0)  Concerns:  (Unplanned pregnancy; screen for depression)  Multiple Providers or Specialists: 1    Clinical Concerns:  Current Medical Concerns:   Pt is working with physician on birth control.      Plan: CC SW: pt has met her goals. Change status to Inactive    [facility]  Clinic Care Coordinator-  Clinics: [place], [place], [place]  E-Mail: [email or web address]  Telephone: [phone]

## 2018-05-29 ENCOUNTER — OFFICE VISIT (OUTPATIENT)
Dept: URGENT CARE | Facility: URGENT CARE | Age: 23
End: 2018-05-29
Payer: COMMERCIAL

## 2018-05-29 VITALS
WEIGHT: 228.8 LBS | BODY MASS INDEX: 38.07 KG/M2 | RESPIRATION RATE: 12 BRPM | SYSTOLIC BLOOD PRESSURE: 119 MMHG | OXYGEN SATURATION: 99 % | DIASTOLIC BLOOD PRESSURE: 52 MMHG | HEART RATE: 66 BPM | TEMPERATURE: 98.1 F

## 2018-05-29 DIAGNOSIS — Z32.01 PREGNANCY TEST POSITIVE: Primary | ICD-10-CM

## 2018-05-29 DIAGNOSIS — N91.2 ABSENCE OF MENSTRUATION: ICD-10-CM

## 2018-05-29 LAB — BETA HCG QUAL IFA URINE: POSITIVE

## 2018-05-29 PROCEDURE — 84703 CHORIONIC GONADOTROPIN ASSAY: CPT | Performed by: PHYSICIAN ASSISTANT

## 2018-05-29 PROCEDURE — 99213 OFFICE O/P EST LOW 20 MIN: CPT | Performed by: PHYSICIAN ASSISTANT

## 2018-05-29 RX ORDER — PRENATAL VIT/IRON FUM/FOLIC AC 27MG-0.8MG
1 TABLET ORAL DAILY
Qty: 100 TABLET | Refills: 3 | Status: SHIPPED | OUTPATIENT
Start: 2018-05-29 | End: 2019-06-11

## 2018-05-29 NOTE — PATIENT INSTRUCTIONS
(Z32.01) Pregnancy test positive  (primary encounter diagnosis)  Comment: approximately 11 weeks, based on your last menses  Plan: Prenatal Vit-Fe Fumarate-FA (PRENATAL         MULTIVITAMIN PLUS IRON) 27-0.8 MG TABS per         Tablet    Stop oral contraceptives    Establish care with OB    Estimated date of delivery 12/12/18            (N91.2) Absence of menstruation  Comment:   Plan: Beta HCG qual IFA urine

## 2018-05-29 NOTE — LETTER
[place] [facility]  [address] [address]  [phone]      May 29, 2018    RE:  Jazlyn Ch                                                                                                                                                       [address]  [address] [facility] [place] [address]            To whom it may concern:    Jazlyn Ch was seen in clinic today for a positive pregnancy test.          Sincerely,        Leighann Schulz    [facility]

## 2018-05-29 NOTE — MR AVS SNAPSHOT
After Visit Summary   5/29/2018    Jazlyn Ch    MRN: [id number]           Patient Information     Date Of Birth          1995        Visit Information        Provider Department      5/29/2018 2:40 PM Leighann Bill PA-C [facility]        Today's Diagnoses     Pregnancy test positive    -  1    Absence of menstruation          Care Instructions    (Z32.01) Pregnancy test positive  (primary encounter diagnosis)  Comment: approximately 11 weeks, based on your last menses  Plan: Prenatal Vit-Fe Fumarate-FA (PRENATAL         MULTIVITAMIN PLUS IRON) 27-0.8 MG TABS per         Tablet    Stop oral contraceptives    Establish care with OB    Estimated date of delivery 12/12/18            (N91.2) Absence of menstruation  Comment:   Plan: Beta HCG qual IFA urine                      Follow-ups after your visit        Your next 10 appointments already scheduled     Jun 05, 2018 10:00 AM CDT   US PELVIC COMPLETE W TRANSVAGINAL with WEUS1   [facility] ([facility])    [address] [place] [address] [phone]           Please bring a list of your medicines (including vitamins, minerals and over-the-counter drugs). Also, tell your doctor about any allergies you may have. Wear comfortable clothes and leave your valuables at home.  Adults: Drink six 8-ounce glasses of fluid one hour before your exam. Do NOT empty your bladder.  If you need to empty your bladder before your exam, try to release only a little bit of urine. Then, drink another 8oz glass of fluid.  Children: Children who are potty trained should drink at least 4 cups (32 oz) of liquid 45 minutes to one hour prior to the exam. The child s bladder must be full in order to achieve a diagnostic exam. If your child is very uncomfortable or has an urgent need to pee, please notify a technologist; they will try to find out how much  longer the wait may be and provide instructions to help relieve the pressure. Occasionally it is medically necessary to insert a urinary catheter to fill the bladder.  Please call the Imaging Department at your exam site with any questions.            Jun 05, 2018 10:40 AM CDT   New Prenatal with FILIPPO Macdonald CNM, WE TRIAGE   [facility] ([facility] [place])    [address] [place] [address] [address]  [place] [address] [phone]              Who to contact     If you have questions or need follow up information about today's clinic visit or your schedule please contact [facility] directly at [phone].  Normal or non-critical lab and imaging results will be communicated to you by MyChart, letter or phone within 4 business days after the clinic has received the results. If you do not hear from us within 7 days, please contact the clinic through MyChart or phone. If you have a critical or abnormal lab result, we will notify you by phone as soon as possible.  Submit refill requests through [employer] or call your pharmacy and they will forward the refill request to us. Please allow 3 business days for your refill to be completed.          Additional Information About Your Visit        Care EveryWhere ID     This is your Care EveryWhere ID. This could be used by other organizations to access your [facility] medical records  [id number]        Your Vitals Were     Pulse Temperature Respirations Pulse Oximetry BMI (Body Mass Index)       66 98.1  F (36.7  C) (Oral) 12 99% 38.07 kg/m2        Blood Pressure from Last 3 Encounters:   05/29/18 119/52   08/05/17 114/65   08/03/17 112/72    Weight from Last 3 Encounters:   05/29/18 228 lb 12.8 oz (103.8 kg)   08/03/17 241 lb (109.3 kg)   08/03/17 242 lb (109.8 kg)              We Performed the Following     Beta HCG qual IFA urine          Today's Medication Changes          These changes are accurate  as of 5/29/18  5:02 PM.  If you have any questions, ask your nurse or doctor.               These medicines have changed or have updated prescriptions.        Dose/Directions    * prenatal multivitamin plus iron 27-0.8 MG Tabs per tablet   This may have changed:  Another medication with the same name was added. Make sure you understand how and when to take each.   Used for:  Supervision of normal intrauterine pregnancy in multigravida, third trimester   Changed by:  Leighann Bill PA-C        Dose:  1 tablet   Take 1 tablet by mouth daily   Quantity:  100 tablet   Refills:  11       * prenatal multivitamin plus iron 27-0.8 MG Tabs per tablet   This may have changed:  You were already taking a medication with the same name, and this prescription was added. Make sure you understand how and when to take each.   Used for:  Pregnancy test positive   Changed by:  Leighann Bill PA-C        Dose:  1 tablet   Take 1 tablet by mouth daily   Quantity:  100 tablet   Refills:  3       * Notice:  This list has 2 medication(s) that are the same as other medications prescribed for you. Read the directions carefully, and ask your doctor or other care provider to review them with you.         Where to get your medicines      These medications were sent to [address] [address]     Phone:  [phone]     prenatal multivitamin plus iron 27-0.8 MG Tabs per tablet                Primary Care Provider Office Phone # Fax #    [facility] [phone]       [address] [place] [address]        Equal Access to Services     LINDA SOSA : Joanne robertson Sojorge luis, waaxda luqadaha, qaybta kaalmada susie sanderson. So [facility] [phone].    ATENCIÓN: Si habla español, tiene a ferrell disposición servicios gratuitos de asistencia lingüística. Llame al  [phone].    We comply with applicable federal civil rights laws and [place] laws. We do not discriminate on the basis of race, color, national origin, age, disability, sex, sexual orientation, or gender identity.            Thank you!     Thank you for choosing [facility]  for your care. Our goal is always to provide you with excellent care. Hearing back from our patients is one way we can continue to improve our services. Please take a few minutes to complete the written survey that you may receive in the mail after your visit with us. Thank you!             Your Updated Medication List - Protect others around you: Learn how to safely use, store and throw away your medicines at www.disposemymeds.org.          This list is accurate as of 5/29/18  5:02 PM.  Always use your most recent med list.                   Brand Name Dispense Instructions for use Diagnosis    * prenatal multivitamin plus iron 27-0.8 MG Tabs per tablet     100 tablet    Take 1 tablet by mouth daily    Supervision of normal intrauterine pregnancy in multigravida, third trimester       * prenatal multivitamin plus iron 27-0.8 MG Tabs per tablet     100 tablet    Take 1 tablet by mouth daily    Pregnancy test positive       TYLENOL PO      Take 1,000 mg by mouth every 8 hours as needed for mild pain or fever        * Notice:  This list has 2 medication(s) that are the same as other medications prescribed for you. Read the directions carefully, and ask your doctor or other care provider to review them with you.

## 2018-06-04 DIAGNOSIS — O36.80X0 ENCOUNTER TO DETERMINE FETAL VIABILITY OF PREGNANCY: Primary | ICD-10-CM

## 2018-09-29 ENCOUNTER — OFFICE VISIT (OUTPATIENT)
Dept: URGENT CARE | Facility: URGENT CARE | Age: 23
End: 2018-09-29
Payer: COMMERCIAL

## 2018-09-29 VITALS
DIASTOLIC BLOOD PRESSURE: 78 MMHG | TEMPERATURE: 98.9 F | HEART RATE: 62 BPM | OXYGEN SATURATION: 100 % | SYSTOLIC BLOOD PRESSURE: 118 MMHG

## 2018-09-29 DIAGNOSIS — N89.8 VAGINAL DISCHARGE: ICD-10-CM

## 2018-09-29 DIAGNOSIS — R35.0 FREQUENT URINATION: Primary | ICD-10-CM

## 2018-09-29 DIAGNOSIS — B37.31 YEAST INFECTION OF THE VAGINA: ICD-10-CM

## 2018-09-29 LAB
ALBUMIN UR-MCNC: NEGATIVE MG/DL
APPEARANCE UR: CLEAR
BACTERIA #/AREA URNS HPF: ABNORMAL /HPF
BILIRUB UR QL STRIP: NEGATIVE
COLOR UR AUTO: YELLOW
GLUCOSE UR STRIP-MCNC: NEGATIVE MG/DL
HGB UR QL STRIP: NEGATIVE
KETONES UR STRIP-MCNC: ABNORMAL MG/DL
LEUKOCYTE ESTERASE UR QL STRIP: NEGATIVE
MUCOUS THREADS #/AREA URNS LPF: PRESENT /LPF
NITRATE UR QL: NEGATIVE
PH UR STRIP: 6 PH (ref 5–7)
RBC #/AREA URNS AUTO: ABNORMAL /HPF
SOURCE: ABNORMAL
SP GR UR STRIP: >1.03 (ref 1–1.03)
SPECIMEN SOURCE: ABNORMAL
UROBILINOGEN UR STRIP-ACNC: 1 EU/DL (ref 0.2–1)
WBC #/AREA URNS AUTO: ABNORMAL /HPF
WET PREP SPEC: ABNORMAL

## 2018-09-29 PROCEDURE — 87210 SMEAR WET MOUNT SALINE/INK: CPT | Performed by: PHYSICIAN ASSISTANT

## 2018-09-29 PROCEDURE — 81001 URINALYSIS AUTO W/SCOPE: CPT | Performed by: PHYSICIAN ASSISTANT

## 2018-09-29 PROCEDURE — 99213 OFFICE O/P EST LOW 20 MIN: CPT | Performed by: PHYSICIAN ASSISTANT

## 2018-09-29 RX ORDER — FLUCONAZOLE 150 MG/1
150 TABLET ORAL ONCE
Qty: 1 TABLET | Refills: 1 | Status: SHIPPED | OUTPATIENT
Start: 2018-09-29 | End: 2018-09-29

## 2018-09-29 NOTE — MR AVS SNAPSHOT
After Visit Summary   9/29/2018    Jazlyn Ch    MRN: [id number]           Patient Information     Date Of Birth          1995        Visit Information        Provider Department      9/29/2018 10:25 AM Taye Mtz PA-C [facility]        Today's Diagnoses     Frequent urination    -  1    Vaginal discharge        Yeast infection of the vagina          Care Instructions      Please refrain from sexual intercourse until symptoms have resolved       Yeast Infection (Candida Vaginal Infection)    You have a Candida vaginal infection. This is also known as a yeast infection. It is most often caused by a type of yeast (fungus) called Candida. Candida are normally found in the vagina. But if they increase in number, this can lead to infection and cause symptoms.  Symptoms of a yeast infection can include:    Clumpy or thin, white discharge, which may look like cottage cheese    Itching or burning    Burning with urination  Certain factors can make a yeast infection more likely. These can include:    Taking certain medicines, such as antibiotics or birth control pills    Pregnancy    Diabetes    Weak immune system  A yeast infection is most often treated with antifungal medicine. This may be given as a vaginal cream or pills you take by mouth. Treatment may last for about 1 to 7 days. Women with severe or recurrent infections may need longer courses of treatment.  Home care    If you re prescribed medicine, be sure to use it as directed. Finish all of the medicine, even if your symptoms go away. Note: Don t try to treat yourself using over-the-counter products without talking to your provider first. He or she will let you know if this is a good option for you.    Ask your provider what steps you can take to help reduce your risk of having a yeast infection in the future.  Follow-up care  Follow up with your healthcare provider, or as directed.  When to  "seek medical advice  Call your healthcare provider right away if:    You have a fever of 100.4 F (38 C) or higher, or as directed by your provider.    Your symptoms worsen, or they don t go away within a few days of starting treatment.    You have new pain in the lower belly or pelvic region.    You have side effects that bother you or a reaction to the cream or pills you re prescribed.    You or any partners you have sex with have new symptoms, such as a rash, joint pain, or sores.  Date Last Reviewed: 10/1/2017    9518-3920 [employer]. [address] [place] [address]. All rights reserved. This information is not intended as a substitute for professional medical care. Always follow your healthcare professional's instructions.                Follow-ups after your visit        Who to contact     If you have questions or need follow up information about today's clinic visit or your schedule please contact [facility] directly at [phone].  Normal or non-critical lab and imaging results will be communicated to you by ideaForgehart, letter or phone within 4 business days after the clinic has received the results. If you do not hear from us within 7 days, please contact the clinic through [employer] or phone. If you have a critical or abnormal lab result, we will notify you by phone as soon as possible.  Submit refill requests through [employer] or call your pharmacy and they will forward the refill request to us. Please allow 3 business days for your refill to be completed.          Additional Information About Your Visit        [employer] Information     [employer] lets you send messages to your doctor, view your test results, renew your prescriptions, schedule appointments and more. To sign up, go to [email or web address] . Click on \"Log in\" on the left side of the screen, which will take you to the Welcome page. Then click on \"Sign up Now\" on the right side of the page. "     You will be asked to enter the access code listed below, as well as some personal information. Please follow the directions to create your username and password.     Your access code is: [email or web address]  Expires: 2018 11:04 AM     Your access code will  in 90 days. If you need help or a new code, please call your [place] clinic or [phone].        Care EveryWhere ID     This is your Care EveryWhere ID. This could be used by other organizations to access your [place] medical records  [id number]        Your Vitals Were     Pulse Temperature Pulse Oximetry             62 98.9  F (37.2  C) (Oral) 100%          Blood Pressure from Last 3 Encounters:   18 118/78   18 119/52   17 114/65    Weight from Last 3 Encounters:   18 228 lb 12.8 oz (103.8 kg)   17 241 lb (109.3 kg)   17 242 lb (109.8 kg)              We Performed the Following     UA with Microscopic reflex to Culture     Wet prep          Today's Medication Changes          These changes are accurate as of 18 11:04 AM.  If you have any questions, ask your nurse or doctor.               Start taking these medicines.        Dose/Directions    fluconazole 150 MG tablet   Commonly known as:  DIFLUCAN   Used for:  Vaginal discharge, Yeast infection of the vagina        Dose:  150 mg   Take 1 tablet (150 mg) by mouth once for 1 dose   Quantity:  1 tablet   Refills:  1            Where to get your medicines      These medications were sent to [facility] [address] [address]     Phone:  [phone]     fluconazole 150 MG tablet                Primary Care Provider Office Phone # Fax #    [facility] [phone]       [address] [place] [address]        Equal Access to Services     CASANDRA SOSA AH: Joanne Serrano, monserrat sow, susie bojorquez  lisseth grajeda ah. So [facility] [phone].    ATENCIÓN: Si jd hernández, tiene a ferrell disposición servicios gratuitos de asistencia lingüística. Maya cook [phone].    We comply with applicable federal civil rights laws and [place] laws. We do not discriminate on the basis of race, color, national origin, age, disability, sex, sexual orientation, or gender identity.            Thank you!     Thank you for choosing [facility]  for your care. Our goal is always to provide you with excellent care. Hearing back from our patients is one way we can continue to improve our services. Please take a few minutes to complete the written survey that you may receive in the mail after your visit with us. Thank you!             Your Updated Medication List - Protect others around you: Learn how to safely use, store and throw away your medicines at www.disposemymeds.org.          This list is accurate as of 9/29/18 11:04 AM.  Always use your most recent med list.                   Brand Name Dispense Instructions for use Diagnosis    fluconazole 150 MG tablet    DIFLUCAN    1 tablet    Take 1 tablet (150 mg) by mouth once for 1 dose    Vaginal discharge, Yeast infection of the vagina       * prenatal multivitamin plus iron 27-0.8 MG Tabs per tablet     100 tablet    Take 1 tablet by mouth daily    Supervision of normal intrauterine pregnancy in multigravida, third trimester       * prenatal multivitamin plus iron 27-0.8 MG Tabs per tablet     100 tablet    Take 1 tablet by mouth daily    Pregnancy test positive       TYLENOL PO      Take 1,000 mg by mouth every 8 hours as needed for mild pain or fever        * Notice:  This list has 2 medication(s) that are the same as other medications prescribed for you. Read the directions carefully, and ask your doctor or other care provider to review them with you.

## 2018-09-29 NOTE — PROGRESS NOTES
SUBJECTIVE:   Jazlyn Ch is a 23 year old female who  presents today for a possible UTI. Symptoms of dysuria and frequency  have been going on for 2 day(s).  Hematuria no.  gradual onset and moderate.  There is no history of fever, chills, nausea or vomiting.  Increase white creamy discharge.  Pain with intercourse.  This patient does  have a history of urinary tract infections. Patient denies long duration, rigors, flank pain, temperature > 101 degrees F., Vomiting, significant nausea or diarrhea, taking Coumadin and GFR less than 30 within the last year.    Past Medical History:   Diagnosis Date     Chlamydia Nov 2014     Chronic kidney disease     kidney stone 2011     Depressive disorder     during previous pregnancy/ situational, postpartum it was diagnosed with second baby.     Gonorrhea  Nov 2014     Current Outpatient Prescriptions   Medication Sig Dispense Refill     Acetaminophen (TYLENOL PO) Take 1,000 mg by mouth every 8 hours as needed for mild pain or fever       Prenatal Vit-Fe Fumarate-FA (PRENATAL MULTIVITAMIN  PLUS IRON) 27-0.8 MG TABS per tablet Take 1 tablet by mouth daily (Patient not taking: Reported on 5/29/2018) 100 tablet 11     Prenatal Vit-Fe Fumarate-FA (PRENATAL MULTIVITAMIN PLUS IRON) 27-0.8 MG TABS per tablet Take 1 tablet by mouth daily (Patient not taking: Reported on 9/29/2018) 100 tablet 3     Social History   Substance Use Topics     Smoking status: Never Smoker     Smokeless tobacco: Never Used     Alcohol use Yes       ROS:   Review of systems negative except as stated above.    OBJECTIVE:  /78 (BP Location: Right arm, Patient Position: Right side, Cuff Size: Adult Regular)  Pulse 62  Temp 98.9  F (37.2  C) (Oral)  SpO2 100%  GENERAL APPEARANCE: healthy, alert and no distress  RESP: lungs clear to auscultation - no rales, rhonchi or wheezes  CV: regular rates and rhythm, normal S1 S2, no murmur noted  ABDOMEN:  soft, nontender, no HSM or masses and bowel sounds  normal  BACK: No CVA tenderness  SKIN: no suspicious lesions or rashes    Results for orders placed or performed in visit on 09/29/18   UA with Microscopic reflex to Culture   Result Value Ref Range    Color Urine Yellow     Appearance Urine Clear     Glucose Urine Negative NEG^Negative mg/dL    Bilirubin Urine Negative NEG^Negative    Ketones Urine Trace (A) NEG^Negative mg/dL    Specific Gravity Urine >1.030 1.003 - 1.035    pH Urine 6.0 5.0 - 7.0 pH    Protein Albumin Urine Negative NEG^Negative mg/dL    Urobilinogen Urine 1.0 0.2 - 1.0 EU/dL    Nitrite Urine Negative NEG^Negative    Blood Urine Negative NEG^Negative    Leukocyte Esterase Urine Negative NEG^Negative    Source Midstream Urine     WBC Urine 0 - 5 OTO5^0 - 5 /HPF    RBC Urine O - 2 OTO2^O - 2 /HPF    Bacteria Urine Few (A) NEG^Negative /HPF    Mucous Urine Present (A) NEG^Negative /LPF   Wet prep   Result Value Ref Range    Specimen Description Vagina     Wet Prep No Trichomonas seen     Wet Prep No clue cells seen     Wet Prep Yeast seen (A)          ASSESSMENT:   (R35.0) Frequent urination  (primary encounter diagnosis)  Plan: UA with Microscopic reflex to Culture      (N89.8) Vaginal discharge  Plan: Wet prep, fluconazole (DIFLUCAN) 150 MG tablet      (B37.3) Yeast infection of the vagina  Plan: fluconazole (DIFLUCAN) 150 MG tablet      Follow up with PCP if symptoms worsen or fail to improve      Patient Instructions     Please refrain from sexual intercourse until symptoms have resolved       Yeast Infection (Candida Vaginal Infection)    You have a Candida vaginal infection. This is also known as a yeast infection. It is most often caused by a type of yeast (fungus) called Candida. Candida are normally found in the vagina. But if they increase in number, this can lead to infection and cause symptoms.  Symptoms of a yeast infection can include:    Clumpy or thin, white discharge, which may look like cottage cheese    Itching or  burning    Burning with urination  Certain factors can make a yeast infection more likely. These can include:    Taking certain medicines, such as antibiotics or birth control pills    Pregnancy    Diabetes    Weak immune system  A yeast infection is most often treated with antifungal medicine. This may be given as a vaginal cream or pills you take by mouth. Treatment may last for about 1 to 7 days. Women with severe or recurrent infections may need longer courses of treatment.  Home care    If you re prescribed medicine, be sure to use it as directed. Finish all of the medicine, even if your symptoms go away. Note: Don t try to treat yourself using over-the-counter products without talking to your provider first. He or she will let you know if this is a good option for you.    Ask your provider what steps you can take to help reduce your risk of having a yeast infection in the future.  Follow-up care  Follow up with your healthcare provider, or as directed.  When to seek medical advice  Call your healthcare provider right away if:    You have a fever of 100.4 F (38 C) or higher, or as directed by your provider.    Your symptoms worsen, or they don t go away within a few days of starting treatment.    You have new pain in the lower belly or pelvic region.    You have side effects that bother you or a reaction to the cream or pills you re prescribed.    You or any partners you have sex with have new symptoms, such as a rash, joint pain, or sores.  Date Last Reviewed: 10/1/2017    6979-4366 [employer]. [address] [place] [address], [place] [address]. All rights reserved. This information is not intended as a substitute for professional medical care. Always follow your healthcare professional's instructions.

## 2018-09-29 NOTE — PATIENT INSTRUCTIONS
Please refrain from sexual intercourse until symptoms have resolved       Yeast Infection (Candida Vaginal Infection)    You have a Candida vaginal infection. This is also known as a yeast infection. It is most often caused by a type of yeast (fungus) called Candida. Candida are normally found in the vagina. But if they increase in number, this can lead to infection and cause symptoms.  Symptoms of a yeast infection can include:    Clumpy or thin, white discharge, which may look like cottage cheese    Itching or burning    Burning with urination  Certain factors can make a yeast infection more likely. These can include:    Taking certain medicines, such as antibiotics or birth control pills    Pregnancy    Diabetes    Weak immune system  A yeast infection is most often treated with antifungal medicine. This may be given as a vaginal cream or pills you take by mouth. Treatment may last for about 1 to 7 days. Women with severe or recurrent infections may need longer courses of treatment.  Home care    If you re prescribed medicine, be sure to use it as directed. Finish all of the medicine, even if your symptoms go away. Note: Don t try to treat yourself using over-the-counter products without talking to your provider first. He or she will let you know if this is a good option for you.    Ask your provider what steps you can take to help reduce your risk of having a yeast infection in the future.  Follow-up care  Follow up with your healthcare provider, or as directed.  When to seek medical advice  Call your healthcare provider right away if:    You have a fever of 100.4 F (38 C) or higher, or as directed by your provider.    Your symptoms worsen, or they don t go away within a few days of starting treatment.    You have new pain in the lower belly or pelvic region.    You have side effects that bother you or a reaction to the cream or pills you re prescribed.    You or any partners you have sex with have new  symptoms, such as a rash, joint pain, or sores.  Date Last Reviewed: 10/1/2017    6366-1282 [employer]. [address] [place] [address], [place] [address]. All rights reserved. This information is not intended as a substitute for professional medical care. Always follow your healthcare professional's instructions.

## 2018-10-06 ENCOUNTER — OFFICE VISIT (OUTPATIENT)
Dept: URGENT CARE | Facility: URGENT CARE | Age: 23
End: 2018-10-06
Payer: COMMERCIAL

## 2018-10-06 ENCOUNTER — TRANSFERRED RECORDS (OUTPATIENT)
Dept: HEALTH INFORMATION MANAGEMENT | Facility: CLINIC | Age: 23
End: 2018-10-06

## 2018-10-06 VITALS
TEMPERATURE: 98.6 F | WEIGHT: 218.5 LBS | OXYGEN SATURATION: 98 % | SYSTOLIC BLOOD PRESSURE: 126 MMHG | RESPIRATION RATE: 18 BRPM | HEART RATE: 67 BPM | DIASTOLIC BLOOD PRESSURE: 78 MMHG | BODY MASS INDEX: 36.36 KG/M2

## 2018-10-06 DIAGNOSIS — K11.21 PAROTITIS, ACUTE: Primary | ICD-10-CM

## 2018-10-06 PROCEDURE — 99000 SPECIMEN HANDLING OFFICE-LAB: CPT | Performed by: INTERNAL MEDICINE

## 2018-10-06 PROCEDURE — 99214 OFFICE O/P EST MOD 30 MIN: CPT | Performed by: INTERNAL MEDICINE

## 2018-10-06 PROCEDURE — 40000957 ZZHCL STATISTIC MUMPS VIRUS PCR: Mod: 90 | Performed by: INTERNAL MEDICINE

## 2018-10-06 RX ORDER — IBUPROFEN 600 MG/1
600 TABLET, FILM COATED ORAL EVERY 6 HOURS PRN
Qty: 60 TABLET | Refills: 0 | Status: SHIPPED | OUTPATIENT
Start: 2018-10-06 | End: 2019-06-11

## 2018-10-06 NOTE — PATIENT INSTRUCTIONS
The possible causes of your salivary gland swelling include viral infections and a salivary gland stone.  Of the viral infections, we do need to test you for mumps.  We will send specimen to [place] [facility] and let you know about the results.      Treatment is supportive at this time: take ibuprofen as needed for pain.    Stay home for the next two days until Health Department can confirm that the mumps is negative.

## 2018-10-06 NOTE — MR AVS SNAPSHOT
"              After Visit Summary   10/6/2018    Jazlyn Ch    MRN: [id number]           Patient Information     Date Of Birth          1995        Visit Information        Provider Department      10/6/2018 4:05 PM Tobin Vasquez MD [facility]        Today's Diagnoses     Parotitis, acute    -  1      Care Instructions    The possible causes of your salivary gland swelling include viral infections and a salivary gland stone.  Of the viral infections, we do need to test you for mumps.  We will send specimen to [facility] and let you know about the results.      Treatment is supportive at this time: take ibuprofen as needed for pain.    Stay home for the next two days until Health Department can confirm that the mumps is negative.          Follow-ups after your visit        Who to contact     If you have questions or need follow up information about today's clinic visit or your schedule please contact [facility] directly at [phone].  Normal or non-critical lab and imaging results will be communicated to you by [facility], letter or phone within 4 business days after the clinic has received the results. If you do not hear from us within 7 days, please contact the clinic through [facility] or phone. If you have a critical or abnormal lab result, we will notify you by phone as soon as possible.  Submit refill requests through [employer] or call your pharmacy and they will forward the refill request to us. Please allow 3 business days for your refill to be completed.          Additional Information About Your Visit        MyChart Information     [employer] lets you send messages to your doctor, view your test results, renew your prescriptions, schedule appointments and more. To sign up, go to [email or web address] . Click on \"Log in\" on the left side of the screen, which will take you to the Welcome page. Then click on \"Sign up Now\" on " the right side of the page.     You will be asked to enter the access code listed below, as well as some personal information. Please follow the directions to create your username and password.     Your access code is: [email or web address]  Expires: 2018 11:04 AM     Your access code will  in 90 days. If you need help or a new code, please call your [place] clinic or [phone].        Care EveryWhere ID     This is your Care EveryWhere ID. This could be used by other organizations to access your [facility] medical records  [id number]        Your Vitals Were     Pulse Temperature Respirations Pulse Oximetry BMI (Body Mass Index)       67 98.6  F (37  C) (Oral) 18 98% 36.36 kg/m2        Blood Pressure from Last 3 Encounters:   10/06/18 126/78   18 118/78   18 119/52    Weight from Last 3 Encounters:   10/06/18 218 lb 8 oz (99.1 kg)   18 228 lb 12.8 oz (103.8 kg)   17 241 lb (109.3 kg)              We Performed the Following     Mumps Confirmation PCR          Today's Medication Changes          These changes are accurate as of 10/6/18  5:14 PM.  If you have any questions, ask your nurse or doctor.               Start taking these medicines.        Dose/Directions    ibuprofen 600 MG tablet   Commonly known as:  ADVIL/MOTRIN   Used for:  Parotitis, acute   Started by:  Tobin Vasquez MD        Dose:  600 mg   Take 1 tablet (600 mg) by mouth every 6 hours as needed for moderate pain   Quantity:  60 tablet   Refills:  0            Where to get your medicines      These medications were sent to [facility] [address] [address]     Phone:  [phone]     ibuprofen 600 MG tablet                Primary Care Provider Office Phone # Fax #    [facility] [phone]       [address] [facility] [address]        Equal Access to Services     CASANDRA SOSA AH: oJanne Serrano  wateodorada amaurysandra, qaybta kadonal sanderson, susie parrishaatameka ah. So [facility] [phone].    ATENCIÓN: Si jd hernández, tiene a ferrell disposición servicios gratuitos de asistencia lingüística. Maya al [phone].    We comply with applicable federal civil rights laws and [place] laws. We do not discriminate on the basis of race, color, national origin, age, disability, sex, sexual orientation, or gender identity.            Thank you!     Thank you for choosing [facility]  for your care. Our goal is always to provide you with excellent care. Hearing back from our patients is one way we can continue to improve our services. Please take a few minutes to complete the written survey that you may receive in the mail after your visit with us. Thank you!             Your Updated Medication List - Protect others around you: Learn how to safely use, store and throw away your medicines at www.disposemymeds.org.          This list is accurate as of 10/6/18  5:14 PM.  Always use your most recent med list.                   Brand Name Dispense Instructions for use Diagnosis    ibuprofen 600 MG tablet    ADVIL/MOTRIN    60 tablet    Take 1 tablet (600 mg) by mouth every 6 hours as needed for moderate pain    Parotitis, acute       * prenatal multivitamin plus iron 27-0.8 MG Tabs per tablet     100 tablet    Take 1 tablet by mouth daily    Supervision of normal intrauterine pregnancy in multigravida, third trimester       * prenatal multivitamin plus iron 27-0.8 MG Tabs per tablet     100 tablet    Take 1 tablet by mouth daily    Pregnancy test positive       TYLENOL PO      Take 1,000 mg by mouth every 8 hours as needed for mild pain or fever        * Notice:  This list has 2 medication(s) that are the same as other medications prescribed for you. Read the directions carefully, and ask your doctor or other care provider to review them with you.

## 2018-10-06 NOTE — LETTER
October 15, 2018      Jazlyn Ch  [address]  [facility] [place] [address]        To Whom It May Concern:    Jazlyn Ch was seen on 10/6/2018 and has missed work due to pending lab results.  Please excuse her for all the days she missed from 10/6-10/15.  She is cleared to return to work as of 10/15/2018.        Sincerely,        Tobin Vasquez MD

## 2018-10-06 NOTE — PROGRESS NOTES
SUBJECTIVE:  Jazlyn Ch, a 23 year old female, presents for evaluation of facial pain.  Associated swelling around the jaw and behind the right ear.  This started last night with associated sense of pulling.  Then began more intense swelling.  Some headache.  No myalgias/arthralgias.  No abdominal pain. No sore throat. Denies congestion.  She does have some perennial allergic rhinitis which is at it's normal.  Ear feels itchy but also not out of character.  Hearing is normal. Denies fevers/chills/sweats.  She believes that she has received all usual childhood vaccines but we do not have documentation.    ROS:  The following systems have been completely reviewed and are negative except as noted in the HPI: CONSTITUTIONAL, HEAD AND NECK, PULMONARY, GASTROINTESTINAL, MUSCULOSKELETAL and NEUROLOGIC    SOCH: no recent travel; no known contacts with mumps    OBJECTIVE:  /78  Pulse 67  Temp 98.6  F (37  C) (Oral)  Resp 18  Wt 218 lb 8 oz (99.1 kg)  SpO2 98%  BMI 36.36 kg/m2  GENERAL: healthy, alert and no distress  HENT: ear canals and TM's normal and there is significant tender swelling of the right parotid gland with some prominence of Stensen's duct; no OP erythema; left parotid gland is normal  NECK: no adenopathy, no asymmetry, masses, or scars and thyroid normal to palpation  RESP: clear to auscultation and percussion bilaterally; normal I:E ratio  CV: regular rates and rhythm, normal S1 S2, no S3 or S4 and no murmur, click or rub -  ABDOMEN: soft, nontender, without hepatosplenomegaly or masses and bowel sounds normal    ASSESSMENT/PLAN:    ICD-10-CM    1. Parotitis, acute K11.21 Mumps Confirmation PCR     ibuprofen (ADVIL/MOTRIN) 600 MG tablet    Acute unilateral parotitis in a patient without other clear explanation.  Considered viral parotitis (mumps, EBV, HHV, enteroviruses, parainfluenzavirus, adenovirus), suppurative bacterial parotitis, sialolithiasis.  The whole syndrome is most  suggestive of a viral cause.  Case discussed with [facility]  who recommends surveillance testing for mumps.  Buccal swab obtained along with Case Report Forms for submission to [facility].       Tobin Vasquez MD

## 2018-10-06 NOTE — LETTER
[facility]  [address] [address]  [phone]      October 6, 2018    RE:  Jazlyn Ch                                                                                                                                                       [address] [place] [address] AVE  [address] [facility] [address]            To whom it may concern:    I have seen Jazlyn Ch in the Urgent Care Clinic on 10/6/2018.  We have asked her to stay home from work for the next two days.  Thank you.      Sincerely,        Tobin Vasquez    [facility]

## 2018-10-08 ENCOUNTER — TELEPHONE (OUTPATIENT)
Dept: URGENT CARE | Facility: URGENT CARE | Age: 23
End: 2018-10-08

## 2018-10-08 ENCOUNTER — NURSE TRIAGE (OUTPATIENT)
Dept: NURSING | Facility: CLINIC | Age: 23
End: 2018-10-08

## 2018-10-08 NOTE — TELEPHONE ENCOUNTER
Clinic Action Needed:  Yes, callback  [facility]NA Triage Call  Presenting Problem:    Jazlyn is calling and is requesting to speak with MD Tobin Vasquez regarding a recent visit and note.  Jazlyn is needing a note to be excused from work today as she has not heard back from the [employer] yet.  Please phone Jazlyn at [phone].      Routed to:  RN Pool  Please be sure to close this encounter once this patient's issue/question has been addressed.    Katherine Duarte RN/[facility]

## 2018-10-08 NOTE — TELEPHONE ENCOUNTER
Checked with Dr. Wang and he suggested to wait until the end of the day to see if lab results would be available.  We cannot clear patient until results come back.  Left message for patient to call back if she has any questions.    Clive Francisco, CMA

## 2018-10-08 NOTE — TELEPHONE ENCOUNTER
New letter faxed to patient employer stating she should not return to work pending lab results.    Clive Francisco, CMA

## 2018-10-08 NOTE — TELEPHONE ENCOUNTER
Jazlyn is calling and is requesting to speak with MD Tobin Vasquez regarding a recent visit and note.  Jazlyn is needing a note to be excused from work today as she has not heard back from the [employer] yet.  Please phone Jazlyn at [phone].

## 2018-10-09 ENCOUNTER — TELEPHONE (OUTPATIENT)
Dept: URGENT CARE | Facility: URGENT CARE | Age: 23
End: 2018-10-09

## 2018-10-09 NOTE — TELEPHONE ENCOUNTER
Calling for mumps PCR result which is still in process.   Please contact pt when mumps result is back.

## 2018-10-11 ENCOUNTER — NURSE TRIAGE (OUTPATIENT)
Dept: NURSING | Facility: CLINIC | Age: 23
End: 2018-10-11

## 2018-10-11 ENCOUNTER — HOSPITAL ENCOUNTER (EMERGENCY)
Facility: CLINIC | Age: 23
Discharge: HOME | End: 2018-10-11
Attending: EMERGENCY MEDICINE | Admitting: EMERGENCY MEDICINE
Payer: COMMERCIAL

## 2018-10-11 VITALS
BODY MASS INDEX: 36.96 KG/M2 | HEART RATE: 72 BPM | OXYGEN SATURATION: 98 % | WEIGHT: 222.1 LBS | TEMPERATURE: 98.2 F | RESPIRATION RATE: 18 BRPM | DIASTOLIC BLOOD PRESSURE: 69 MMHG | SYSTOLIC BLOOD PRESSURE: 106 MMHG

## 2018-10-11 DIAGNOSIS — T78.40XA ALLERGIC REACTION, INITIAL ENCOUNTER: ICD-10-CM

## 2018-10-11 PROCEDURE — 25000128 H RX IP 250 OP 636: Performed by: EMERGENCY MEDICINE

## 2018-10-11 PROCEDURE — 99284 EMERGENCY DEPT VISIT MOD MDM: CPT | Mod: 25 | Performed by: EMERGENCY MEDICINE

## 2018-10-11 PROCEDURE — 96365 THER/PROPH/DIAG IV INF INIT: CPT | Performed by: EMERGENCY MEDICINE

## 2018-10-11 PROCEDURE — 99283 EMERGENCY DEPT VISIT LOW MDM: CPT | Mod: Z6 | Performed by: EMERGENCY MEDICINE

## 2018-10-11 PROCEDURE — 96375 TX/PRO/DX INJ NEW DRUG ADDON: CPT | Performed by: EMERGENCY MEDICINE

## 2018-10-11 RX ORDER — CETIRIZINE HYDROCHLORIDE 10 MG/1
10 TABLET ORAL DAILY
Qty: 7 TABLET | Refills: 0 | Status: SHIPPED | OUTPATIENT
Start: 2018-10-11 | End: 2018-10-18

## 2018-10-11 RX ORDER — DIPHENHYDRAMINE HYDROCHLORIDE 50 MG/ML
25 INJECTION INTRAMUSCULAR; INTRAVENOUS ONCE
Status: COMPLETED | OUTPATIENT
Start: 2018-10-11 | End: 2018-10-11

## 2018-10-11 RX ORDER — DIPHENHYDRAMINE HCL 25 MG
25-50 TABLET ORAL EVERY 6 HOURS PRN
Qty: 30 TABLET | Refills: 0 | Status: SHIPPED | OUTPATIENT
Start: 2018-10-11 | End: 2019-06-11

## 2018-10-11 RX ADMIN — FAMOTIDINE 20 MG: 20 INJECTION, SOLUTION INTRAVENOUS at 01:17

## 2018-10-11 RX ADMIN — DIPHENHYDRAMINE HYDROCHLORIDE 25 MG: 50 INJECTION, SOLUTION INTRAMUSCULAR; INTRAVENOUS at 01:16

## 2018-10-11 NOTE — ED PROVIDER NOTES
[facility] EMERGENCY DEPARTMENT ([facility])  10/11/18   History     Chief Complaint   Patient presents with     Hives     Facial Swelling     HPI  Jazlyn Ch is a 23 year old female without significant past medical history who presents to the Emergency Department for evaluation of hives and facial swelling.  She was recently seen for some parotid swelling at an outside hospital and a mumps workup was initiated, but not confirmed.    Today developed acute onset of rash over her face, upper extremities and lower extremities.   No associated shortness of breath, change in voice, throat tightness, abdominal pain chest pain or shortness of breath.    Called the nurse line referred to the emergency department.    I have reviewed the Medications, Allergies, Past Medical and Surgical History, and Social History in the [employer] system.    Past Medical History:   Diagnosis Date     Chlamydia Nov 2014     Chronic kidney disease     kidney stone 2011     Depressive disorder     during previous pregnancy/ situational, postpartum it was diagnosed with second baby.     Gonorrhea  Nov 2014       Past Surgical History:   Procedure Laterality Date     NO HISTORY OF SURGERY         Family History   Problem Relation Age of Onset     Diabetes Maternal Grandmother      Type II doabetis       Social History   Substance Use Topics     Smoking status: Never Smoker     Smokeless tobacco: Never Used     Alcohol use Yes         Review of Systems    14 point review of symptoms was performed and is negative except as noted above.       Physical Exam   BP: 107/66  Pulse: 70  Temp: 97.9  F (36.6  C)  Weight: 100.7 kg (222 lb 1.6 oz)  SpO2: 97 %      Physical Exam   GEN: Well appearing, non toxic, cooperative and conversant.   HEENT: The head is normocephalic and atraumatic. Pupils are equal round and reactive to light. Extraocular motions are intact. There is no facial swelling. The neck is nontender and supple.   CV: Regular rate  and rhythm without murmurs rubs or gallops. 2+ radial pulses bilaterally.  PULM: Clear to auscultation bilaterally.  ABD: Soft, nontender, nondistended.   EXT: Full range of motion.  No edema.  NEURO: Cranial nerves II through XII are intact and symmetric. Bilateral upper and lower extremities grossly show full range of motion without any focal deficits.   SKIN: Scattered hives visible over the face, anterior chest, and upper extremities.  PSYCH: Calm and cooperative, interactive.       ED Course     ED Course     Procedures               Labs Ordered and Resulted from Time of ED Arrival Up to the Time of Departure from the ED - No data to display         Assessments & Plan (with Medical Decision Making)   Type I hypersensitivity, allergic reaction most likely  Given IV H1 and H2 blockers with excellent improvement in symptoms.  No evidence of any airway or GI issues in the emergency department or prior.  Broad differential considered including angioedema which is not evident, other toxic dermatitis, psoriasis.     Very well-appearing and requesting discharge, appropriate for outpatient follow-up.  -Zyrtec  -Benadryl as needed      - Patient agrees to our plan and is ready and eager for discharge. Care plan, follow up plan, and reasons to return immediately to the ED were dicussed in detail and summarized as noted in the discharge instructions.      I have reviewed the nursing notes.    I have reviewed the findings, diagnosis, plan and need for follow up with the patient.    New Prescriptions    CETIRIZINE (ZYRTEC) 10 MG TABLET    Take 1 tablet (10 mg) by mouth daily for 7 days    DIPHENHYDRAMINE (BENADRYL) 25 MG TABLET    Take 1-2 tablets (25-50 mg) by mouth every 6 hours as needed for itching or allergies       Final diagnoses:   Allergic reaction, initial encounter       10/11/2018   [facility], [place], EMERGENCY DEPARTMENT     Taye Matos MD  10/11/18 0149

## 2018-10-11 NOTE — ED NOTES
Pt present to ER after hives and itching started two hours ago. Pt states she was swabbed for the swelling on the right side of her jaw four days ago with no results after calling clinic everyday.  Pt denies any change in detergent, lotions, medications, food intake, new clothing or any cold like symptoms.  Pt A&O x4, denies chest pain, SOB, lungs are clear bilateral, bowel sounds normoactive.

## 2018-10-11 NOTE — ED AVS SNAPSHOT
[facility], [place], Emergency Department    [address] [place] [address]    Phone:  [phone]                                       Jazlyn Ch   MRN: [id number]    Department:  [facility], Emergency Department   Date of Visit:  10/11/2018           After Visit Summary Signature Page     I have received my discharge instructions, and my questions have been answered. I have discussed any challenges I see with this plan with the nurse or doctor.    ..........................................................................................................................................  Patient/Patient Representative Signature      ..........................................................................................................................................  Patient Representative Print Name and Relationship to Patient    ..................................................               ................................................  Date                                   Time    ..........................................................................................................................................  Reviewed by Signature/Title    ...................................................              ..............................................  Date                                               Time          22EPIC Rev 08/18

## 2018-10-11 NOTE — DISCHARGE INSTRUCTIONS
Please make an appointment to follow up with Your Primary Care Provider in 7 days even if entirely better.  You can call to discuss the appropriate follow up timing with your doctor.     Discharge Instructions  Allergic Reaction    An allergic reaction can result in a rash, itching, swelling, watery eyes, or a runny nose. A serious reaction can cause swelling of your mouth or throat, or wheezing. The most serious allergy is called analphylaxis, and can be life-threatening. Many allergies result in hives, also called urticaria.     An allergy happens when the body s natural defense system (immune system) overreacts to something harmless. The thing that triggers your allergic reaction is called an allergen. The first time you are exposed to your allergen, you may not have any reaction, but the body makes a protein called an antibody. The antibody lets the body recognize and remember the allergen.  Every time you are exposed to your allergen you get more antibody and your reaction can be more severe.      Call 911 if you have:    Swelling of the lips, tongue or throat.    Hoarse voice, drooling or trouble breathing.    Chest pain or shortness of breath.    Fainting or unconsciousness.    Return to the Emergency Department if:    You develop a fever.    You have significant abdominal pain.    You vomit more than once.    Your rash changes or looks very different.    What can I do to help myself?    If you know what caused your allergy, don t touch it, throw any of it away, and tell others not to have it around you. Wear a medical alert bracelet with a name of your allergen on it.    If you don t know what you are allergic to, keep a journal of everything that you are exposed to (foods, soaps, medicines, etc.). Take this with you when you follow up with your primary doctor. This may help determine what is causing the allergic reaction.    Take any medicines that are prescribed.    Antihistamines can decrease rash or  itching. You may use Benadryl  (diphenhydramine) for rash or itching according to package directions, or use a prescription antihistamine as recommended by your physician.    For significant allergic reactions, you may have been given an epinephrine (adrenaline) auto injector (EpiPen ). Carry this with you at all times! Use it if you are having any symptoms of anaphylaxis.  Do not be afraid to use it. Always call 911 if you use your EpiPen ! It is only meant to buy time until you can get to the Emergency Department!  If you were given a prescription for medicine here today, be sure to read all of the information (including the package insert) that comes with your prescription.  This will include important information about the medicine, its side effects, and any warnings that you need to know about.  The pharmacist who fills the prescription can provide more information and answer questions you may have about the medicine.  If you have questions or concerns that the pharmacist cannot address, please call or return to the Emergency Department.     Opioid Medication Information    Pain medications are among the most commonly prescribed medicines, so we are including this information for all our patients. If you did not receive pain medication or get a prescription for pain medicine, you can ignore it.     You may have been given a prescription for an opioid (narcotic) pain medicine and/or have received a pain medicine while here in the Emergency Department. These medicines can make you drowsy or impaired. You must not drive, operate dangerous equipment, or engage in any other dangerous activities while taking these medications. If you drive while taking these medications, you could be arrested for DUI, or driving under the influence. Do not drink any alcohol while you are taking these medications.     Opioid pain medications can cause addiction. If you have a history of chemical dependency of any type, you are at a  higher risk of becoming addicted to pain medications.  Only take these prescribed medications to treat your pain when all other options have been tried. Take it for as short a time and as few doses as possible. Store your pain pills in a secure place, as they are frequently stolen and provide a dangerous opportunity for children or visitors in your house to start abusing these powerful medications. We will not replace any lost or stolen medicine.  As soon as your pain is better, you should flush all your remaining medication.     Many prescription pain medications contain Tylenol  (acetaminophen), including Vicodin , Tylenol #3 , Norco , Lortab , and Percocet .  You should not take any extra pills of Tylenol  if you are using these prescription medications or you can get very sick.  Do not ever take more than 3000 mg of acetaminophen in any 24 hour period.    All opioids tend to cause constipation. Drink plenty of water and eat foods that have a lot of fiber, such as fruits, vegetables, prune juice, apple juice and high fiber cereal.  Take a laxative if you don t move your bowels at least every other day. Miralax , Milk of Magnesia, Colace , or Senna  can be used to keep you regular.      Remember that you can always come back to the Emergency Department if you are not able to see your regular doctor in the amount of time listed above, if you get any new symptoms, or if there is anything that worries you.

## 2018-10-11 NOTE — ED AVS SNAPSHOT
[facility], Emergency Department    [address] [address]    Phone:  [phone]                                       Jazlyn Ch   MRN: [id number]    Department:  [facility], Emergency Department   Date of Visit:  10/11/2018           Patient Information     Date Of Birth          1995        Your diagnoses for this visit were:     Allergic reaction, initial encounter        You were seen by Taye Matos MD.        Discharge Instructions       Please make an appointment to follow up with Your Primary Care Provider in 7 days even if entirely better.  You can call to discuss the appropriate follow up timing with your doctor.     Discharge Instructions  Allergic Reaction    An allergic reaction can result in a rash, itching, swelling, watery eyes, or a runny nose. A serious reaction can cause swelling of your mouth or throat, or wheezing. The most serious allergy is called analphylaxis, and can be life-threatening. Many allergies result in hives, also called urticaria.     An allergy happens when the body s natural defense system (immune system) overreacts to something harmless. The thing that triggers your allergic reaction is called an allergen. The first time you are exposed to your allergen, you may not have any reaction, but the body makes a protein called an antibody. The antibody lets the body recognize and remember the allergen.  Every time you are exposed to your allergen you get more antibody and your reaction can be more severe.      Call 911 if you have:    Swelling of the lips, tongue or throat.    Hoarse voice, drooling or trouble breathing.    Chest pain or shortness of breath.    Fainting or unconsciousness.    Return to the Emergency Department if:    You develop a fever.    You have significant abdominal pain.    You vomit more than once.    Your rash changes or looks very different.    What can I do to help myself?    If you know what caused your  allergy, don t touch it, throw any of it away, and tell others not to have it around you. Wear a medical alert bracelet with a name of your allergen on it.    If you don t know what you are allergic to, keep a journal of everything that you are exposed to (foods, soaps, medicines, etc.). Take this with you when you follow up with your primary doctor. This may help determine what is causing the allergic reaction.    Take any medicines that are prescribed.    Antihistamines can decrease rash or itching. You may use Benadryl  (diphenhydramine) for rash or itching according to package directions, or use a prescription antihistamine as recommended by your physician.    For significant allergic reactions, you may have been given an epinephrine (adrenaline) auto injector (EpiPen ). Carry this with you at all times! Use it if you are having any symptoms of anaphylaxis.  Do not be afraid to use it. Always call 911 if you use your EpiPen ! It is only meant to buy time until you can get to the Emergency Department!  If you were given a prescription for medicine here today, be sure to read all of the information (including the package insert) that comes with your prescription.  This will include important information about the medicine, its side effects, and any warnings that you need to know about.  The pharmacist who fills the prescription can provide more information and answer questions you may have about the medicine.  If you have questions or concerns that the pharmacist cannot address, please call or return to the Emergency Department.     Opioid Medication Information    Pain medications are among the most commonly prescribed medicines, so we are including this information for all our patients. If you did not receive pain medication or get a prescription for pain medicine, you can ignore it.     You may have been given a prescription for an opioid (narcotic) pain medicine and/or have received a pain medicine while here  in the Emergency Department. These medicines can make you drowsy or impaired. You must not drive, operate dangerous equipment, or engage in any other dangerous activities while taking these medications. If you drive while taking these medications, you could be arrested for DUI, or driving under the influence. Do not drink any alcohol while you are taking these medications.     Opioid pain medications can cause addiction. If you have a history of chemical dependency of any type, you are at a higher risk of becoming addicted to pain medications.  Only take these prescribed medications to treat your pain when all other options have been tried. Take it for as short a time and as few doses as possible. Store your pain pills in a secure place, as they are frequently stolen and provide a dangerous opportunity for children or visitors in your house to start abusing these powerful medications. We will not replace any lost or stolen medicine.  As soon as your pain is better, you should flush all your remaining medication.     Many prescription pain medications contain Tylenol  (acetaminophen), including Vicodin , Tylenol #3 , Norco , Lortab , and Percocet .  You should not take any extra pills of Tylenol  if you are using these prescription medications or you can get very sick.  Do not ever take more than 3000 mg of acetaminophen in any 24 hour period.    All opioids tend to cause constipation. Drink plenty of water and eat foods that have a lot of fiber, such as fruits, vegetables, prune juice, apple juice and high fiber cereal.  Take a laxative if you don t move your bowels at least every other day. Miralax , Milk of Magnesia, Colace , or Senna  can be used to keep you regular.      Remember that you can always come back to the Emergency Department if you are not able to see your regular doctor in the amount of time listed above, if you get any new symptoms, or if there is anything that worries you.        24 Hour  Appointment Hotline       To make an appointment at any [facility], call [phone] ([phone]). If you don't have a family doctor or clinic, we will help you find one. [place] clinics are conveniently located to serve the needs of you and your family.             Review of your medicines      START taking        Dose / Directions Last dose taken    cetirizine 10 MG tablet   Commonly known as:  zyrTEC   Dose:  10 mg   Quantity:  7 tablet        Take 1 tablet (10 mg) by mouth daily for 7 days   Refills:  0        diphenhydrAMINE 25 MG tablet   Commonly known as:  BENADRYL   Dose:  25-50 mg   Quantity:  30 tablet        Take 1-2 tablets (25-50 mg) by mouth every 6 hours as needed for itching or allergies   Refills:  0          Our records show that you are taking the medicines listed below. If these are incorrect, please call your family doctor or clinic.        Dose / Directions Last dose taken    ibuprofen 600 MG tablet   Commonly known as:  ADVIL/MOTRIN   Dose:  600 mg   Quantity:  60 tablet        Take 1 tablet (600 mg) by mouth every 6 hours as needed for moderate pain   Refills:  0        * prenatal multivitamin plus iron 27-0.8 MG Tabs per tablet   Dose:  1 tablet   Quantity:  100 tablet        Take 1 tablet by mouth daily   Refills:  11        * prenatal multivitamin plus iron 27-0.8 MG Tabs per tablet   Dose:  1 tablet   Quantity:  100 tablet        Take 1 tablet by mouth daily   Refills:  3        TYLENOL PO   Dose:  1000 mg        Take 1,000 mg by mouth every 8 hours as needed for mild pain or fever   Refills:  0        * Notice:  This list has 2 medication(s) that are the same as other medications prescribed for you. Read the directions carefully, and ask your doctor or other care provider to review them with you.            Prescriptions were sent or printed at these locations (2 Prescriptions)                   Other Prescriptions                Printed at Department/Unit printer (2 of 2)  "        cetirizine (ZYRTEC) 10 MG tablet               diphenhydrAMINE (BENADRYL) 25 MG tablet                Orders Needing Specimen Collection     None      Pending Results     No orders found from 10/9/2018 to 10/12/2018.            Pending Culture Results     No orders found from 10/9/2018 to 10/12/2018.            Pending Results Instructions     If you had any lab results that were not finalized at the time of your Discharge, you can call the ED Lab Result RN at [phone]. You will be contacted by this team for any positive Lab results or changes in treatment. The nurses are available 7 days a week from 10A to 6:30P.  You can leave a message 24 hours per day and they will return your call.        Thank you for choosing [facility]       Thank you for choosing [facility] for your care. Our goal is always to provide you with excellent care. Hearing back from our patients is one way we can continue to improve our services. Please take a few minutes to complete the written survey that you may receive in the mail after you visit with us. Thank you!        [employer] Information     [employer] lets you send messages to your doctor, view your test results, renew your prescriptions, schedule appointments and more. To sign up, go to [email or web address] . Click on \"Log in\" on the left side of the screen, which will take you to the Welcome page. Then click on \"Sign up Now\" on the right side of the page.     You will be asked to enter the access code listed below, as well as some personal information. Please follow the directions to create your username and password.     Your access code is: [email or web address]  Expires: 2018 11:04 AM     Your access code will  in 90 days. If you need help or a new code, please call your [facility] clinic or [phone].        Care EveryWhere ID     This is your Care EveryWhere ID. This could be used by other organizations to access your [facility] medical records  [id number]      "   Equal Access to Services     CASANDRA SOSA : Joanne Serrano, monserrat sow, susie bojorquez. So [facility] [phone].    ATENCIÓN: Si habla español, tiene a ferrell disposición servicios gratuitos de asistencia lingüística. Llame al [phone].    We comply with applicable federal civil rights laws and [place] laws. We do not discriminate on the basis of race, color, national origin, age, disability, sex, sexual orientation, or gender identity.            After Visit Summary       This is your record. Keep this with you and show to your community pharmacist(s) and doctor(s) at your next visit.

## 2018-10-11 NOTE — TELEPHONE ENCOUNTER
Pt is waiting for mumps lab work to come back still from 10/6/18. Now states she has a widespread rash from head to toe and swelling around eyes, forehead, and cheeks. Denies fever, peeling skin, stiff neck, joint pain, sores in mouth, or blisters.     Protocol and care advice reviewed.  Pt's  will bring her to the ED.  Caller states understanding of the recommended disposition.   Advised to call back if further questions or concerns.      Reason for Disposition    Face becomes swollen    Additional Information    Negative: [1] Life-threatening reaction (anaphylaxis) in the past to similar substance (e.g., food, insect bite/sting, chemical, etc.) AND [2] < 2 hours since exposure    Negative: [1] Sudden onset of rash (within last 2 hours) AND [2] difficulty with breathing or swallowing    Negative: Shock suspected (e.g., cold/pale/clammy skin, too weak to stand, low BP, rapid pulse)    Negative: Difficult to awaken or acting confused  (e.g., disoriented, slurred speech)    Negative: [1] Purple or blood-colored spots or dots AND [2] fever    Negative: Sounds like a life-threatening emergency to the triager    Negative: [1] Widespread rash AND [2] bright red, sunburn-like AND [3] current tampon use or nasal packing    Negative: [1] Widespread rash AND [2] bright red, sunburn-like AND [3] wound infection or recent surgery    Negative: [1] Bright red skin AND [2] peels off in sheets    Negative: Stiff neck (unable to touch chin to chest)    Negative: Fever    Negative: Joint pain or swelling    Negative: Rash looks like large or small blisters (i.e., fluid filled bubbles or sacs on the skin)    Negative: Patient sounds very sick or weak to the triager    Negative: [1] Purple or blood-colored rash (spots or dots) AND [2] no fever AND [3] sounds well to triager    Negative: Sores in mouth    Protocols used: RASH OR REDNESS - WIDESPREAD-ADULT-

## 2018-10-11 NOTE — ED TRIAGE NOTES
Pt arrives with all over hives, burning itching sensation everywhere. She also has a MUMPS test pending at the [facility]. She denies exposure to anything she is allergic to. She states this started two hours ago. The parotid gland swelling has been going on since Saturday.

## 2018-10-12 NOTE — TELEPHONE ENCOUNTER
Per lab, mumps is negative. Pt results need to be cultured and it would take 3x weeks for results. Pt states she would need a letter for work. Farzana Xiong MA

## 2018-10-13 NOTE — TELEPHONE ENCOUNTER
Patient can return to work.  Please call her and let her know.  Please write a letter for her if needed to cover any missed days of work.    Tobin Vasquez MD

## 2018-10-14 ENCOUNTER — NURSE TRIAGE (OUTPATIENT)
Dept: NURSING | Facility: CLINIC | Age: 23
End: 2018-10-14

## 2018-10-14 NOTE — TELEPHONE ENCOUNTER
Reason for Disposition    Caller requesting lab results    Additional Information    Negative: [1] Caller is not with the adult (patient) AND [2] reporting urgent symptoms    Lab result questions    Negative: Lab calling with strep throat test results and triager can call in prescription    Negative: Lab calling with urinalysis test results and triager can call in prescription    Negative: Medication questions    Negative: ED call to PCP    Negative: Physician call to PCP    Negative: Call about patient who is currently hospitalized    Negative: Lab or radiology calling with CRITICAL test results    Negative: [1] Prescription not at pharmacy AND [2] was prescribed today by PCP    Negative: [1] Follow-up call from patient regarding patient's clinical status AND [2] information urgent    Negative: [1] Caller requests to speak ONLY to PCP AND [2] URGENT question    Negative: [1] Caller requests to speak to PCP now AND [2] won't tell us reason for call  (Exception: if 10 pm to 6 am, caller must first discuss reason for the call)    Negative: Notification of hospital admission    Negative: Notification of death    Protocols used: PCP CALL - NO TRIAGE-ADULT-, INFORMATION ONLY CALL-ADULT-  Caller is requesting results of mumps test that was performed to 10/06/18. Triager advised caller that results are not in and to speak with provider if any further questions during business hours.

## 2018-10-15 ENCOUNTER — TELEPHONE (OUTPATIENT)
Dept: URGENT CARE | Facility: URGENT CARE | Age: 23
End: 2018-10-15

## 2018-10-15 NOTE — TELEPHONE ENCOUNTER
Reason for Call:  Other call back    Detailed comments: test results    Phone Number Patient can be reached at: Cell number on file:    Telephone Information:   Mobile [phone]       Best Time: any    Can we leave a detailed message on this number? YES    Call taken on 10/15/2018 at 1:55 PM by Dee Rocha

## 2018-11-08 LAB
MUMPS CONFIRMATION PCR: NORMAL
MUMPS SPECIMEN TYPE: NORMAL

## 2019-03-02 ENCOUNTER — OFFICE VISIT (OUTPATIENT)
Dept: URGENT CARE | Facility: URGENT CARE | Age: 24
End: 2019-03-02
Payer: COMMERCIAL

## 2019-03-02 VITALS
OXYGEN SATURATION: 98 % | RESPIRATION RATE: 20 BRPM | WEIGHT: 198 LBS | HEART RATE: 93 BPM | TEMPERATURE: 100.9 F | SYSTOLIC BLOOD PRESSURE: 104 MMHG | DIASTOLIC BLOOD PRESSURE: 64 MMHG | BODY MASS INDEX: 32.95 KG/M2

## 2019-03-02 DIAGNOSIS — J10.1 INFLUENZA DUE TO INFLUENZA VIRUS, TYPE A, HUMAN: ICD-10-CM

## 2019-03-02 DIAGNOSIS — J10.1 INFLUENZA A: Primary | ICD-10-CM

## 2019-03-02 LAB
FLUAV+FLUBV AG SPEC QL: NEGATIVE
FLUAV+FLUBV AG SPEC QL: POSITIVE
SPECIMEN SOURCE: ABNORMAL

## 2019-03-02 PROCEDURE — 87804 INFLUENZA ASSAY W/OPTIC: CPT | Performed by: PHYSICIAN ASSISTANT

## 2019-03-02 PROCEDURE — 99213 OFFICE O/P EST LOW 20 MIN: CPT | Performed by: PHYSICIAN ASSISTANT

## 2019-03-02 RX ORDER — OSELTAMIVIR PHOSPHATE 75 MG/1
75 CAPSULE ORAL 2 TIMES DAILY
Qty: 10 CAPSULE | Refills: 0 | Status: ON HOLD | OUTPATIENT
Start: 2019-03-02 | End: 2019-06-12

## 2019-03-02 NOTE — PATIENT INSTRUCTIONS
You have Influenza A. You should take all the Tamiflu as prescribed. Drink plenty of fluids and get a lot of rest. The Tamiflu will shorten the course of your illness, but will not cure it. You can expect to be ill for the next 7-12 days.

## 2019-03-02 NOTE — LETTER
[place] [facility]  [address] [address]  [phone]      March 2, 2019    RE:  Jazlyn Ch                                                                                                                                                       [address]  [address] [facility] [address]            To whom it may concern:    Jazlyn Ch is under my professional care for    Fever  Influenza A  Influenza due to influenza virus, type A, human.   She will need to miss work on 3/2, 3/3, 3/4 and 3/5/2019. She may also need to miss additional work beyond these days, depending on her symptoms.       Sincerely,        Dee FLORES    [place] [facility]

## 2019-03-03 NOTE — PROGRESS NOTES
Jazlyn Ch is a 23 year old year old female who presents today for evaluation of complaints of cough, chills, headache and a fever for the past 2 days. Did not get the influenza vaccine this year.    Review Of Systems  Skin: negative  Eyes: negative  Ears/Nose/Throat: negative  Respiratory: Cough- productive  Cardiovascular: negative  Gastrointestinal: minor GI upset  Genitourinary: negative  Musculoskeletal: muscle aches, chills        Past Medical History:   Diagnosis Date     Chlamydia Nov 2014     Chronic kidney disease     kidney stone 2011     Depressive disorder     during previous pregnancy/ situational, postpartum it was diagnosed with second baby.     Gonorrhea  Nov 2014       Past Surgical History:   Procedure Laterality Date     NO HISTORY OF SURGERY         Family History   Problem Relation Age of Onset     Diabetes Maternal Grandmother         Type II doabetis       Social History     Tobacco Use     Smoking status: Never Smoker     Smokeless tobacco: Never Used   Substance Use Topics     Alcohol use: Yes       Drug and lactation database from the [place] [employer]:  http://toxnet.nlm.nih.gov/cgi-bin/sis/htmlgen?LACT      Exam:  Constitutional: healthy, alert and no distress  Head: negative  Neck: Neck supple. No adenopathy.   ENT: left TM normal without fluid or infection, right TM red - not bulging,neck without nodes, throat normal without erythema or exudate, sinuses nontender and nasal mucosa congested  Cardiovascular: negative  Respiratory: negative, no wheezes, rales or rhochi  Skin: no suspicious lesions or rashes      A/P:    (R50.9) Fever  (primary encounter diagnosis)    Plan: Influenza A/B antigen           (J10.1) Influenza A    Plan: oseltamivir (TAMIFLU) 75 MG capsule           (J10.1) Influenza due to influenza virus, type A, human    Plan: oseltamivir (TAMIFLU) 75 MG capsule      Fluids, rest. Contact children's pediatrician to discuss whether they  should receive prophylactic treatment for influenza.  Follow up with primary MD prn problems/worsening symptoms.

## 2019-06-11 ENCOUNTER — HOSPITAL ENCOUNTER (INPATIENT)
Facility: CLINIC | Age: 24
LOS: 1 days | Discharge: HOME | End: 2019-06-12
Attending: EMERGENCY MEDICINE | Admitting: PSYCHIATRY & NEUROLOGY
Payer: MEDICAID

## 2019-06-11 DIAGNOSIS — F30.9 MANIA (H): ICD-10-CM

## 2019-06-11 DIAGNOSIS — F23 ACUTE PSYCHOSIS (H): ICD-10-CM

## 2019-06-11 DIAGNOSIS — F30.10 MANIC BEHAVIOR (H): Primary | ICD-10-CM

## 2019-06-11 LAB
AMPHETAMINES UR QL SCN: NEGATIVE
BARBITURATES UR QL: NEGATIVE
BENZODIAZ UR QL: NEGATIVE
CANNABINOIDS UR QL SCN: POSITIVE
COCAINE UR QL: NEGATIVE
HCG UR QL: NEGATIVE
OPIATES UR QL SCN: NEGATIVE
PCP UR QL SCN: NEGATIVE

## 2019-06-11 PROCEDURE — 81025 URINE PREGNANCY TEST: CPT | Performed by: EMERGENCY MEDICINE

## 2019-06-11 PROCEDURE — 80307 DRUG TEST PRSMV CHEM ANLYZR: CPT | Performed by: EMERGENCY MEDICINE

## 2019-06-11 PROCEDURE — 99285 EMERGENCY DEPT VISIT HI MDM: CPT | Mod: 25

## 2019-06-11 PROCEDURE — 12400000 ZZH R&B MH

## 2019-06-11 PROCEDURE — 90791 PSYCH DIAGNOSTIC EVALUATION: CPT

## 2019-06-11 PROCEDURE — 25000132 ZZH RX MED GY IP 250 OP 250 PS 637: Performed by: EMERGENCY MEDICINE

## 2019-06-11 RX ORDER — OLANZAPINE 10 MG/1
10 TABLET, ORALLY DISINTEGRATING ORAL
Status: DISCONTINUED | OUTPATIENT
Start: 2019-06-11 | End: 2019-06-11

## 2019-06-11 RX ORDER — OLANZAPINE 5 MG/1
10 TABLET ORAL ONCE
Status: COMPLETED | OUTPATIENT
Start: 2019-06-11 | End: 2019-06-11

## 2019-06-11 RX ORDER — LANOLIN ALCOHOL/MO/W.PET/CERES
3 CREAM (GRAM) TOPICAL
Status: DISCONTINUED | OUTPATIENT
Start: 2019-06-11 | End: 2019-06-12 | Stop reason: HOSPADM

## 2019-06-11 RX ORDER — ACETAMINOPHEN 500 MG
1000 TABLET ORAL 3 TIMES DAILY PRN
Status: DISCONTINUED | OUTPATIENT
Start: 2019-06-11 | End: 2019-06-12 | Stop reason: HOSPADM

## 2019-06-11 RX ORDER — OLANZAPINE 10 MG/2ML
10 INJECTION, POWDER, FOR SOLUTION INTRAMUSCULAR
Status: DISCONTINUED | OUTPATIENT
Start: 2019-06-11 | End: 2019-06-11

## 2019-06-11 RX ORDER — OLANZAPINE 10 MG/2ML
10 INJECTION, POWDER, FOR SOLUTION INTRAMUSCULAR EVERY 6 HOURS PRN
Status: DISCONTINUED | OUTPATIENT
Start: 2019-06-11 | End: 2019-06-12

## 2019-06-11 RX ORDER — HYDROXYZINE HYDROCHLORIDE 25 MG/1
25 TABLET, FILM COATED ORAL
Status: DISCONTINUED | OUTPATIENT
Start: 2019-06-11 | End: 2019-06-12 | Stop reason: HOSPADM

## 2019-06-11 RX ORDER — ACETAMINOPHEN 325 MG/1
650 TABLET ORAL EVERY 4 HOURS PRN
Status: DISCONTINUED | OUTPATIENT
Start: 2019-06-11 | End: 2019-06-11

## 2019-06-11 RX ORDER — OLANZAPINE 10 MG/1
10 TABLET, ORALLY DISINTEGRATING ORAL EVERY 6 HOURS PRN
Status: DISCONTINUED | OUTPATIENT
Start: 2019-06-11 | End: 2019-06-12

## 2019-06-11 RX ADMIN — OLANZAPINE 10 MG: 5 TABLET, FILM COATED ORAL at 22:00

## 2019-06-11 RX ADMIN — MELATONIN TAB 3 MG 3 MG: 3 TAB at 22:14

## 2019-06-11 RX ADMIN — HYDROXYZINE HYDROCHLORIDE 25 MG: 25 TABLET, FILM COATED ORAL at 22:14

## 2019-06-11 RX ADMIN — ACETAMINOPHEN 650 MG: 325 TABLET, FILM COATED ORAL at 22:14

## 2019-06-11 ASSESSMENT — ACTIVITIES OF DAILY LIVING (ADL)
RETIRED_EATING: 0-->INDEPENDENT
AMBULATION: 0-->INDEPENDENT
TRANSFERRING: 0-->INDEPENDENT
FALL_HISTORY_WITHIN_LAST_SIX_MONTHS: NO
TOILETING: 0-->INDEPENDENT
SWALLOWING: 0-->SWALLOWS FOODS/LIQUIDS WITHOUT DIFFICULTY
COGNITION: 0 - NO COGNITION ISSUES REPORTED
RETIRED_COMMUNICATION: 0-->UNDERSTANDS/COMMUNICATES WITHOUT DIFFICULTY
DRESS: 0-->INDEPENDENT
BATHING: 0-->INDEPENDENT

## 2019-06-11 ASSESSMENT — ENCOUNTER SYMPTOMS
APPETITE CHANGE: 1
NERVOUS/ANXIOUS: 1
SLEEP DISTURBANCE: 1
HALLUCINATIONS: 0
DYSPHORIC MOOD: 0
CONFUSION: 1

## 2019-06-11 ASSESSMENT — MIFFLIN-ST. JEOR: SCORE: 1668.94

## 2019-06-12 VITALS
WEIGHT: 200 LBS | OXYGEN SATURATION: 100 % | RESPIRATION RATE: 16 BRPM | HEART RATE: 136 BPM | TEMPERATURE: 97.4 F | BODY MASS INDEX: 32.14 KG/M2 | HEIGHT: 66 IN | DIASTOLIC BLOOD PRESSURE: 66 MMHG | SYSTOLIC BLOOD PRESSURE: 113 MMHG

## 2019-06-12 PROCEDURE — 25000132 ZZH RX MED GY IP 250 OP 250 PS 637: Performed by: PSYCHIATRY & NEUROLOGY

## 2019-06-12 RX ORDER — HYDROXYZINE HYDROCHLORIDE 25 MG/1
25 TABLET, FILM COATED ORAL ONCE
Status: COMPLETED | OUTPATIENT
Start: 2019-06-12 | End: 2019-06-12

## 2019-06-12 RX ORDER — OLANZAPINE 10 MG/1
10 TABLET ORAL AT BEDTIME
Qty: 30 TABLET | Refills: 0 | Status: SHIPPED | OUTPATIENT
Start: 2019-06-12 | End: 2021-04-26

## 2019-06-12 RX ORDER — OLANZAPINE 10 MG/1
10 TABLET ORAL AT BEDTIME
Status: DISCONTINUED | OUTPATIENT
Start: 2019-06-12 | End: 2019-06-12 | Stop reason: HOSPADM

## 2019-06-12 RX ADMIN — HYDROXYZINE HYDROCHLORIDE 25 MG: 25 TABLET, FILM COATED ORAL at 10:40

## 2019-06-12 ASSESSMENT — ACTIVITIES OF DAILY LIVING (ADL)
HYGIENE/GROOMING: INDEPENDENT
ORAL_HYGIENE: INDEPENDENT
DRESS: STREET CLOTHES;INDEPENDENT
LAUNDRY: WITH SUPERVISION

## 2019-06-12 NOTE — PROGRESS NOTES
Black Skirt  Blue T shirt  Blue spiraled planner    Security envelope #[id number]  Visa [phone]      Admission:  I am responsible for any personal items that are not sent to the safe or pharmacy. [facility] is not responsible for loss, theft or damage of any property in my possession.        Patient Signature: ___________________________________________      Date/Time:__________________________     Staff Signature: __________________________________      Date/Time:__________________________     2nd Staff person, if patient is unable/unwilling to sign:      __________________________________________________________      Date/Time: __________________________        Discharge:  [facility] has returned all of my personal belongings:     Patient Signature: ________________________________________     Date/Time: ____________________________________     Staff Signature: ______________________________________     Date/Time:_____________________________________
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
## 2019-06-12 NOTE — ED TRIAGE NOTES
"Pt here from home seeking out help for mental status. Pt mother  about a week ago in this hospital ED and she has had tremendous responsibility to make \"everything good\" for the rest of the family that has arrived for . Pt states she is not going to her mother's . Pt is in a very manic state as she explains her story.   "

## 2019-06-12 NOTE — PROGRESS NOTES
"At 1250 Pt called from the information desk at Door [address]. Pt was hyper verbal, appeared to have flight of ideas. Pt questioned what she should do. She said, \"I don't have my wallet (No wallet was brought to the hospital.) I don't have any keys, I don't know what to do, I'm grieving.(Pt just lost mom and step dad) I was told I could get medical records and that was a lie.\" Pt was told to come back to the unit, but  before this writer could say, and we will try to help you, Pt yelled, \"I was just discharged, I'm leaving and hung up.\"  Security was called and asked to check on her at door 6. Jenaro from security called back and said, \"No sign of the pt and the greater's at the desk said she walked out the door and headed towards the Mall.\"   "

## 2019-06-12 NOTE — PHARMACY-ADMISSION MEDICATION HISTORY
Admission medication history interview status for the 6/11/2019  admission is complete. See EPIC admission navigator for prior to admission medications     Medication history source reliability:Good    Actions taken by pharmacist (provider contacted, etc): Patient interviewed.      Additional medication history information not noted on PTA med list :None    Medication reconciliation/reorder completed by provider prior to medication history? No    Time spent in this activity: 10 minutes    Prior to Admission medications    Medication Sig Last Dose Taking? Auth Provider   Acetaminophen (TYLENOL PO) Take 1,000 mg by mouth every 8 hours as needed for mild pain or fever as needed Yes Reported, Patient

## 2019-06-12 NOTE — PROGRESS NOTES
Met with patient to review discharge plans. She is anxious to return home and reports feeling far more balanced, more stable. She has been to [facility] in [place] before and is willing to follow up there. When called for appointment, found her insurance is not in place.She will need to supply current information or pay a $25 deposit. Went over this with patient.She said she qualified for MA due to pregnancy- no longer qualifies. She plans to apply for insurance- will contact her financial worker. She said she would keep the appointment and knows she will need ongoing prescriber for medications.

## 2019-06-12 NOTE — DISCHARGE INSTRUCTIONS
Behavioral Discharge Planning and Instructions  Summary: Admitted to hospital with labile mood, rambling speech and paranoia.    Main Diagnosis: Bipolar disorder, manic episode; Cannabis abuse disorder.     Major Treatments, Procedures and Findings: psychiatric assessment. Initiation of medication trial.    Symptoms to Report: increased confusion, losing more sleep or mood getting worse, excessive spending.    Lifestyle Adjustment: Recommend following up with medication management and therapy to assist with understanding current illness and grief issues. Sober lifestyle recommended.    Psychiatry Follow-up:   Behavioral Health Intake with Katherine Lowe,PhD,LP on Thursday 6/13/19 @ 1pm. Come in 15 minutes early and bring current insurance information or $50.00 deposit.  [facility]  [address] [id number] [phone] fax:[phone]    Resources:   [facility] Services- [phone]  Crisis Intervention: 916.223.4690 or 395-039-9128 (TTY: [phone]).  Call anytime for help.  National Harris on Mental Illness (www.mn.sharlene.org): 945.557.5071 or 189-045-1206.  Alcoholics Anonymous (www.alcoholics-anonymous.org): Check your phone book for your local chapter.  National Suicide Prevention Line (www.mentalhealthmn.org): 379-897-UPCY (6585)    General Medication Instructions:   See your medication sheet(s) for instructions.   Take all medicines as directed.  Make no changes unless your doctor suggests them.   Go to all your doctor visits.  Be sure to have all your required lab tests. This way, your medicines can be refilled on time.  Do not use any drugs not prescribed by your doctor.  Avoid alcohol.

## 2019-06-12 NOTE — H&P
"[facility] Psychiatric H&P AND Discharge Summary Note       Initial History     The patient's care was discussed with the treatment team and chart notes were reviewed.     Patient examined for psychiatric admission.     IDENTIFICATION    Patient is a 23 year old  female who is a mother of three children (ages 5, 3, and 1). Pt sees PCP Dr. Lyons, [facility]. Pt seen on 6/12/19.    Patient's history obtained from the patient, the ED note, and chart review.     CHIEF COMPLAINT  Manic behavior     HISTORY OF PRESENT ILLNESS  Patient is a 23 year old female with no history of manic behavior or diagnosis of bipolar disorder, who was admitted from the ED yesterday, 6/11/19 after having her first manic episode. Patient's  and other family members encouraged her to come into the ED to \"check in\" after they felt that patient's mental health and abnormal behavior was concerning to them. Patient's mother recently passed away about one week ago, on 6/4/19, and her step-father passed away shortly after that. For the past week, patient has been under a significant level of stress due to the passing of the aforementioned family members. She has also spent more money than usual, noting having spent about $1000 in the last week. The patient also notes that she is a smokes marijuana on a daily basis, and has been smoking more frequently in the last week to help cope with some of the stress.     Pt has depressed mood, motivation poor, concentration poor, low energy, hopeless, helpless, and worthless without SI or HI, no plan, able to contract for safety.     CHEMICAL DEPENDENCY HISTORY  Admits to daily marijuana use with family members since she was about 13 years old.   Patient with no history of alcohol use disorder, other illicit drug use such as amphetamines, heroin, or opioids.     PAST  PSYCHIATRIC HISTORY  Patient voices a history of depressive episodes in the past, with last episode in " "2016 in which she saw a MH professional by the name of Suzy Franklin, but has not followed with her since then and currently does not see a MH professional. No history of inpatient psychiatric admissions, ECT treatments.     FAMILY HISTORY  Patient mentions a family history of various mental health conditions, but describes her family as very private and that it is stigmatized in her family, so no one talks only about it. She denies any family history of substance abuse.     SOCIAL HISTORY  Born and raised in [place]. Large extended family. Graduated from [employer] in [place], [place]. 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  is a student at the [employer], studying climate change science.   Patient lives with , 3 small children, and her mother-in-law who helps take care of her children.      Medications     Medications Prior to Admission   Medication Sig Dispense Refill Last Dose     Acetaminophen (TYLENOL PO) Take 1,000 mg by mouth every 8 hours as needed for mild pain or fever   as needed     [] oseltamivir (TAMIFLU) 75 MG capsule Take 1 capsule (75 mg) by mouth 2 times daily for 5 days 10 capsule 0        Scheduled Medications:    PRNs:  acetaminophen, hydrOXYzine, melatonin, OLANZapine zydis **OR** OLANZapine      Allergies      Allergies   Allergen Reactions     Banana      anaphyaxis       Seasonal Allergies         Previous Medical History     Past Medical History:   Diagnosis Date     Chlamydia 2014     Chronic kidney disease      Depressive disorder      Gonorrhea  2014        Medical Review of Systems     BP (!) 153/93   Pulse 136   Temp 98.3  F (36.8  C) (Oral)   Ht 1.676 m (5' 6\")   Wt 90.7 kg (200 lb)   SpO2 100%   BMI 32.28 kg/m    Body mass index is 32.28 kg/m .    Previous 10-point ROS completed by Dr. Sharp on 19 reviewed by Tushar Sharp MD on 2019 and is unchanged except for those problems mentioned within the HPI.     " Mental Status Examination     Appearance Sitting upright on bed, dressed in  scrubs. Appears stated age.   Attitude Cooperative   Orientation Oriented to person, place, time   Eye Contact Normal    Speech Rapid rate, but normal rhythm, volume and tone   Language Normal   Psychomotor Behavior Normal   Mood Feels stressed    Affect Appears stressed and anxious   Thought Process Intact for the most part, but intermittently tangential    Associations Intact   Thought Content Patient is currently negative for suicidal ideation, negative for plan or intent, able to contract no self harm and identify barriers to suicide.  Negative for obsessions, compulsions or psychosis.     Fund of Knowledge Intact   Insight Intact   Judgement Intact   Attention Span & Concentration Normal    Recent & Remote Memory Slightly impaired    Gait Did not assess   Muscle Tone Intact      Labs     Labs reviewed.  Recent Results (from the past 24 hour(s))   HCG qualitative urine    Collection Time: 06/11/19  8:53 PM   Result Value Ref Range    HCG Qual Urine Negative NEG^Negative   Drug abuse screen 77 urine (FL, RH, SH)    Collection Time: 06/11/19  8:53 PM   Result Value Ref Range    Amphetamine Qual Urine Negative NEG^Negative    Barbiturates Qual Urine Negative NEG^Negative    Benzodiazepine Qual Urine Negative NEG^Negative    Cannabinoids Qual Urine Positive (A) NEG^Negative    Cocaine Qual Urine Negative NEG^Negative    Opiates Qualitative Urine Negative NEG^Negative    PCP Qual Urine Negative NEG^Negative          Impression     This is a 23 year old female with no prior history of bipolar disorder or manic behavior, who was admitted to station 77 last evening, 6/11/19 for manic behavior. Considering patient has recently experience the deaths of 2 close family members (mother and step-father, both of whom she is close to) within the last week, I suspect that manic behavior is highly related to emotional response to the tragedy and level  of stress. Patient is not currently on any antipsychotic medications or seeking outpatient psychiatric therapy, and therefore I am more inclined to believe this is a first episode of jason related to emotional events of family members passing away and history of chronic marijuana use. Manic behavior includes shopping out of proportion to baseline, making inaccurate remarks about 's field of study, restlessness, and tangential thoughts. I do think that she is much improved today, after receiving 10 mg Zyprexa and much needed rest overnight. Will prescribe patient Zyprexa 10mg at bedtime. Due to patient no longer being suicidal, she is deemed safe to discharge home.     Assessment of Suicide Risk   Patient not actively suicidal or homicidal        Diagnoses     1. Stress-induced jason, first episode without previous history of BPD     Plan     1. Explained side effects, benefits, and complications of medications to the patient, Pt gave verbal consent.  2. Medication changes: Started Zyprexa 10mg at bedtime   3. Discussed treatment plan with patient and team.  4. Projected length of stay: discharge today       Attestation:   Patient has been seen and evaluated by me, Tushar Sharp MD.    Patient ID:  Name: Jazlyn Ch MRN: [id number]  Admission: 6/11/2019 YOB: 1995
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
## 2019-06-12 NOTE — PROGRESS NOTES
Discharge instructions reviewed with patient including follow-up care plan.  Provided resources and follow up plan. Educated on medication regime and advised not to stop prescribed medication without consulting their physician.  Denies SI.  Identifies coping skills. All belongings which where brought into the hospital have been returned to patient. Escorted off the unit by station 77 staff.  Used own cell phone to call a [employer] from hospital exit.

## 2019-06-17 ENCOUNTER — NURSE TRIAGE (OUTPATIENT)
Dept: NURSING | Facility: CLINIC | Age: 24
End: 2019-06-17

## 2019-06-18 NOTE — TELEPHONE ENCOUNTER
Patient calling with hives that she says started after her recent ED visit. I was trying to confirm a few questions with her so I could go on to do a full RN triage, but she sounded frustrated that I was asking her questions, even though I had her chart open in front of me. She said I must not have her chart and that she didn't think I could help her and said good bye, and hung up. Would not listen to my explanations as to why I needed to clarify her information with my questions.     Aga Kendrick RN, [facility]

## 2019-07-11 ENCOUNTER — NURSE TRIAGE (OUTPATIENT)
Dept: NURSING | Facility: CLINIC | Age: 24
End: 2019-07-11

## 2019-07-11 ENCOUNTER — PATIENT OUTREACH (OUTPATIENT)
Dept: CARE COORDINATION | Facility: CLINIC | Age: 24
End: 2019-07-11

## 2019-07-11 ASSESSMENT — ACTIVITIES OF DAILY LIVING (ADL): DEPENDENT_IADLS:: INDEPENDENT
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
## 2019-07-11 NOTE — PROGRESS NOTES
Clinic Care Coordination Contact    Clinic Care Coordination Contact  OUTREACH    Referral Information:  Referral Source: Self-patient/Caregiver    Primary Diagnosis: Psychosocial    Chief Complaint   Patient presents with     Clinic Care Coordination - Initial     Clinical Concerns:  Pt contacted ANDREZ NEWTON to discuss her medical care. ANDREZ NEWTON is supporting pt with needs for her children.     Pt's speech was pressured and difficult to follow. She was very anxious and frustrated. She explained that she has gynecological concerns, she spoke of her mother having HPV and passing away shortly after this diagnosis. She believes there is a connection to this and is afraid that she has HPV and will soon die.     Pt explained that she has made multiple phone calls and been on MyChart to make appointments but due to confusion she has cancelled these appointments. Pt is upset that although she has been telling many within [facility] system (triage nurses and schedulers) what her medical concerns are, she feels that she is not getting adequate care. ANDREZ NEWTON validated pt's concerns and expressed the importance of establishing with one PCP or OBGYN and utilizing their care for her medical concerns, rather than going to urgent care or ED. Pt stated that she is interested in taking her medical care to another medical system. ANDREZ NEWTON encouraged pt to do what ever is best for physical health but cautioned that in all medical clinics she will receive the best care if she establishes with one provider.    Pt went rapidly back and forth between wanting to schedule at a [facility] clinic and declining all [facility] services. Ultimately, pt stated that she would like ANDREZ NEWTON to continue to reach out to her for support for her children, but would like to contacts from [facility] regarding her medical needs.    Plan: No further outreaches will be made at this time regarding pt's medical care needs unless a new referral is made or a change in the pt's status  occurs. Patient was provided with [facility] contact information and encouraged to call with any questions or concerns.     CHINYERE Rod, [facility]  Clinic Care Coordinator  [facility]  [phone]  [email or web address]

## 2019-09-14 ENCOUNTER — NURSE TRIAGE (OUTPATIENT)
Dept: NURSING | Facility: CLINIC | Age: 24
End: 2019-09-14

## 2019-09-14 NOTE — TELEPHONE ENCOUNTER
"Caller says she \"over drank\" last night and today not feeling well at all. Says her chest feels heavy and her heart is beating funny. She says it feel like its hard to breath. I said she should call 911 for evaluation by paramedics and to be transported to the emergency room. She is wondering if there is something she can do at home for her symptoms. Informed she needs to be evaluated to see what is causing the symptoms before care can be given and the emergency room is the best place for her symptoms. Not sure if she wants to call 911. I said to be seen in an emergency room now, calling 911 will be quickest way to be evaluated. She may choose to have someone drive her to the emergency room.    Aga Kendrick RN, [facility]      Reason for Disposition    [1] Chest pain lasts > 5 minutes AND [2] described as crushing, pressure-like, or heavy    Additional Information    Negative: Severe difficulty breathing (e.g., struggling for each breath, speaks in single words)    Negative: Difficult to awaken or acting confused (e.g., disoriented, slurred speech)    Negative: Shock suspected (e.g., cold/pale/clammy skin, too weak to stand, low BP, rapid pulse)    Negative: [1] Chest pain lasts > 5 minutes AND [2] history of heart disease  (i.e., heart attack, bypass surgery, angina, angioplasty, CHF; not just a heart murmur)    Protocols used: CHEST PAIN-A-AH      "

## 2020-03-02 ENCOUNTER — HEALTH MAINTENANCE LETTER (OUTPATIENT)
Age: 25
End: 2020-03-02

## 2020-12-20 ENCOUNTER — HEALTH MAINTENANCE LETTER (OUTPATIENT)
Age: 25
End: 2020-12-20

## 2021-02-21 ENCOUNTER — HOSPITAL ENCOUNTER (EMERGENCY)
Facility: CLINIC | Age: 26
Discharge: HOME | End: 2021-02-21
Attending: EMERGENCY MEDICINE | Admitting: EMERGENCY MEDICINE

## 2021-02-21 VITALS
HEIGHT: 66 IN | HEART RATE: 84 BPM | DIASTOLIC BLOOD PRESSURE: 78 MMHG | SYSTOLIC BLOOD PRESSURE: 116 MMHG | RESPIRATION RATE: 18 BRPM | TEMPERATURE: 97.9 F | BODY MASS INDEX: 32.14 KG/M2 | OXYGEN SATURATION: 100 % | WEIGHT: 200 LBS

## 2021-02-21 DIAGNOSIS — F41.9 ANXIETY: ICD-10-CM

## 2021-02-21 DIAGNOSIS — Z82.49 FAMILY HISTORY OF ISCHEMIC HEART DISEASE: ICD-10-CM

## 2021-02-21 DIAGNOSIS — R07.9 ACUTE CHEST PAIN: ICD-10-CM

## 2021-02-21 LAB
ALBUMIN SERPL-MCNC: 3.9 G/DL (ref 3.4–5)
ALP SERPL-CCNC: 84 U/L (ref 40–150)
ALT SERPL W P-5'-P-CCNC: 18 U/L (ref 0–50)
ANION GAP SERPL CALCULATED.3IONS-SCNC: 5 MMOL/L (ref 3–14)
AST SERPL W P-5'-P-CCNC: 14 U/L (ref 0–45)
BILIRUB SERPL-MCNC: 0.3 MG/DL (ref 0.2–1.3)
BUN SERPL-MCNC: 14 MG/DL (ref 7–30)
CALCIUM SERPL-MCNC: 9 MG/DL (ref 8.5–10.1)
CHLORIDE SERPL-SCNC: 106 MMOL/L (ref 94–109)
CO2 SERPL-SCNC: 27 MMOL/L (ref 20–32)
CREAT SERPL-MCNC: 0.77 MG/DL (ref 0.52–1.04)
GFR SERPL CREATININE-BSD FRML MDRD: >90 ML/MIN/{1.73_M2}
GLUCOSE SERPL-MCNC: 77 MG/DL (ref 70–99)
HCG SERPL QL: NEGATIVE
INTERPRETATION ECG - MUSE: NORMAL
LIPASE SERPL-CCNC: 87 U/L (ref 73–393)
POTASSIUM SERPL-SCNC: 3.6 MMOL/L (ref 3.4–5.3)
PROT SERPL-MCNC: 7.2 G/DL (ref 6.8–8.8)
SODIUM SERPL-SCNC: 138 MMOL/L (ref 133–144)
TROPONIN I SERPL-MCNC: <0.015 UG/L (ref 0–0.04)

## 2021-02-21 PROCEDURE — 80053 COMPREHEN METABOLIC PANEL: CPT | Performed by: EMERGENCY MEDICINE

## 2021-02-21 PROCEDURE — 250N000013 HC RX MED GY IP 250 OP 250 PS 637: Performed by: EMERGENCY MEDICINE

## 2021-02-21 PROCEDURE — 99284 EMERGENCY DEPT VISIT MOD MDM: CPT

## 2021-02-21 PROCEDURE — 93005 ELECTROCARDIOGRAM TRACING: CPT

## 2021-02-21 PROCEDURE — 84484 ASSAY OF TROPONIN QUANT: CPT | Performed by: EMERGENCY MEDICINE

## 2021-02-21 PROCEDURE — 83690 ASSAY OF LIPASE: CPT | Performed by: EMERGENCY MEDICINE

## 2021-02-21 PROCEDURE — 84703 CHORIONIC GONADOTROPIN ASSAY: CPT | Performed by: EMERGENCY MEDICINE

## 2021-02-21 RX ORDER — ACETAMINOPHEN 500 MG
1000 TABLET ORAL ONCE
Status: COMPLETED | OUTPATIENT
Start: 2021-02-21 | End: 2021-02-21

## 2021-02-21 RX ORDER — IBUPROFEN 400 MG/1
400 TABLET, FILM COATED ORAL ONCE
Status: COMPLETED | OUTPATIENT
Start: 2021-02-21 | End: 2021-02-21

## 2021-02-21 RX ADMIN — IBUPROFEN 400 MG: 400 TABLET, FILM COATED ORAL at 20:39

## 2021-02-21 RX ADMIN — ACETAMINOPHEN 1000 MG: 500 TABLET, FILM COATED ORAL at 20:39

## 2021-02-21 ASSESSMENT — MIFFLIN-ST. JEOR: SCORE: 1658.94

## 2021-02-21 NOTE — LETTER
February 21, 2021      To Whom It May Concern:      Jazlyn Ch was seen in our Emergency Department today, 02/21/21. She has been medically cleared and  may return to work tonight.    Sincerely,        Gerardo Boyd MD

## 2021-02-22 NOTE — ED PROVIDER NOTES
"  History   Chief Complaint:  Chest Pain       HPI   History supplemented by electronic chart review    Jazlyn Ch is a 25 year old female who presents with chest pain. The patient reports she has chest pain that started at 1700 tonight while she was working as a . She took as aspirin around 1800. She notes drank caffeine today which is not unusual for her. She has an IUD and is currently menstruating, and denies possibility of pregnancy. She does not have a history of heart problems, DVT, PE, lung problems, or asthma. She lives with her family who have not been ill. Her mom recently  of a heart attack at age 57 and had sarcoidosis.  She states that she thinks her symptoms are from anxiety, though she would like specific reassurance that she is not having a heart attack.  She has never had similar symptoms with exertion.  No recent immobilization or surgery.  No cough or fever.  No vomiting.  She specifically denies any use of illicit drugs such as methamphetamine or cocaine.    Review of Systems   All other systems reviewed and are negative.      Allergies:  No Known Drug Allergies     Medications:  Zyprexa    Past Medical History:    CKD  Depression  Chlamydia  Gonorrhea  Depression   Manic behavior - brief and in setting of grief  Carpal tunnel syndrome    Family History:  Mother  of a heart attack at age 57.    Social History:  The patient presents alone.  Lives with her family.  Works as a , sometimes works 12 hour shifts.    Physical Exam     Patient Vitals for the past 24 hrs:   BP Temp Temp src Pulse Resp SpO2 Height Weight   21 -- -- -- 88 -- 100 % -- --   21 116/78 97.9  F (36.6  C) Oral 58 18 98 % 1.676 m (5' 6\") 90.7 kg (200 lb)     Physical Exam  General: Nontoxic-appearing woman sitting upright in room 12  HENT: mucous membranes moist  CV: rate as above, regular rhythm, no lower extremity edema, no JVD, palpable symmetric radial " "pulses  Resp: normal effort, speaks in full phrases, no stridor, no cough observed  GI: abdomen soft and nontender, no guarding, negative Cedeno's sign  MSK: no bony tenderness to chest  Skin: appropriately warm and dry, no erythema or vesicles to chest wall  Neuro: alert, clear speech, oriented  Psych: cooperative, reports \"anxiety\", pleasant, no evidence of hallucinations, good eye contact, no apparent jason    Emergency Department Course     ECG:  ECG taken at 2014, ECG read at 2019 by Dr. Boyd  Normal sinus rhythm.    Rate 60 bpm. MD interval 158 ms. QRS duration 98 ms. QT/QTc 408/408 ms. P-R-T axes 42 53 41.     Laboratory:  CMP: WNL (Creatinine: 0.77)     Lipase: 87    2030 Troponin I: <0.015     HCG qualitative blood: Negative     Emergency Department Course:    Reviewed:  I reviewed nursing notes, vitals, and past medical history    Assessments:  2021 I obtained history and examined the patient as noted above.   2059 I rechecked the patient and explained findings.     Interventions:  2039 Ibuprofen 400 mg tablet PO  2039 Tylenol 1000 mg tablet PO     Disposition:  The patient was discharged to home.       Impression & Plan     Medical Decision Making:  The patient thinks that her symptoms are likely due to anxiety, this is certainly possible, though she understandably had concerned that she might be having a heart attack and therefore wish to undergo diagnostic evaluation as summarized above.  She did not wish to have the radiation exposure of a chest x-ray, and in light of her normal examination, with oxygen saturation of 100%, I did feel that this can be reasonably deferred given extremely low suspicion for radiographic evidence of pulmonary edema, pneumothorax, pneumonia, or other structural cardiopulmonary disease at this time.  She declined any further treatments.  She is negative by PERC criteria.  Alternate etiologies including pericardial effusion, biliary colic, gastritis, esophagitis, and many " others were considered but thought to be quite unlikely.  Return here for sudden worsening otherwise follow-up through clinic for recheck this coming week.    Covid-19  Jazlyn Ch was evaluated during a global COVID-19 pandemic, which necessitated consideration that the patient might be at risk for infection with the SARS-CoV-2 virus that causes COVID-19.   Applicable protocols for evaluation were followed during the patient's care.     Diagnosis:    ICD-10-CM    1. Acute chest pain  R07.9    2. Anxiety  F41.9    3. Family history of ischemic heart disease  Z82.49        Scribe Disclosure:  I, Savana Mckeon, am serving as a scribe at 8:17 PM on 2/21/2021 to document services personally performed by Issa Boyd MD based on my observations and the provider's statements to me.       This note was completed in part using Dragon voice recognition software. Although reviewed after completion, some word and grammatical errors may occur.      Issa Boyd MD  02/22/21 0045

## 2021-04-18 ENCOUNTER — HEALTH MAINTENANCE LETTER (OUTPATIENT)
Age: 26
End: 2021-04-18

## 2021-04-26 ENCOUNTER — HOSPITAL ENCOUNTER (EMERGENCY)
Facility: CLINIC | Age: 26
Discharge: HOME | End: 2021-04-27
Attending: PHYSICIAN ASSISTANT | Admitting: PHYSICIAN ASSISTANT

## 2021-04-26 DIAGNOSIS — L50.9 URTICARIA: ICD-10-CM

## 2021-04-26 DIAGNOSIS — F41.1 ANXIETY REACTION: ICD-10-CM

## 2021-04-26 PROCEDURE — 99285 EMERGENCY DEPT VISIT HI MDM: CPT | Mod: 25

## 2021-04-26 PROCEDURE — 250N000011 HC RX IP 250 OP 636: Performed by: PHYSICIAN ASSISTANT

## 2021-04-26 PROCEDURE — 96375 TX/PRO/DX INJ NEW DRUG ADDON: CPT

## 2021-04-26 PROCEDURE — 96374 THER/PROPH/DIAG INJ IV PUSH: CPT

## 2021-04-26 RX ORDER — METHYLPREDNISOLONE SODIUM SUCCINATE 125 MG/2ML
125 INJECTION, POWDER, LYOPHILIZED, FOR SOLUTION INTRAMUSCULAR; INTRAVENOUS ONCE
Status: COMPLETED | OUTPATIENT
Start: 2021-04-26 | End: 2021-04-26

## 2021-04-26 RX ORDER — LORAZEPAM 2 MG/ML
0.5 INJECTION INTRAMUSCULAR ONCE
Status: COMPLETED | OUTPATIENT
Start: 2021-04-26 | End: 2021-04-26

## 2021-04-26 RX ADMIN — LORAZEPAM 0.5 MG: 2 INJECTION INTRAMUSCULAR; INTRAVENOUS at 23:41

## 2021-04-26 RX ADMIN — METHYLPREDNISOLONE SODIUM SUCCINATE 125 MG: 125 INJECTION, POWDER, FOR SOLUTION INTRAMUSCULAR; INTRAVENOUS at 23:41

## 2021-04-26 ASSESSMENT — ENCOUNTER SYMPTOMS: SHORTNESS OF BREATH: 0

## 2021-04-26 NOTE — LETTER
April 27, 2021      To Whom It May Concern:      Jazlyn Ch was seen in our Emergency Department today, 04/27/21 for an allergic reaction.            Sincerely,        Mariluz Ledezma RN

## 2021-04-26 NOTE — LETTER
April 27, 2021      To Whom It May Concern:      Jazlyn Ch was seen in our Emergency Department overnight from 04/26/21 to  04/27/21 for an allergic reaction.        Sincerely,        Mariluz Ledezma RN

## 2021-04-27 VITALS
HEART RATE: 102 BPM | DIASTOLIC BLOOD PRESSURE: 89 MMHG | OXYGEN SATURATION: 99 % | TEMPERATURE: 98.3 F | SYSTOLIC BLOOD PRESSURE: 146 MMHG | RESPIRATION RATE: 18 BRPM

## 2021-04-27 RX ORDER — PREDNISONE 20 MG/1
TABLET ORAL
Qty: 6 TABLET | Refills: 0 | Status: SHIPPED | OUTPATIENT
Start: 2021-04-27

## 2021-04-27 RX ORDER — EPINEPHRINE 0.3 MG/.3ML
0.3 INJECTION SUBCUTANEOUS
Qty: 1 EACH | Refills: 1 | Status: SHIPPED | OUTPATIENT
Start: 2021-04-27

## 2021-04-27 NOTE — ED TRIAGE NOTES
Hives and itching all over body for 1.5 hrs. Had 50 benadryl and 20 mg pepcid 1 hr ago PO. Without helping.     Pt A&O x 3, CMS x 3, ABCD's adequate in triage

## 2021-04-27 NOTE — DISCHARGE INSTRUCTIONS
Discharge Instructions  Hives or Urticaria    Hives are a rash with raised, swollen, red, itchy spots on the skin. They may look like mosquito bites. Hives change in size, shape and location over time.  Hives can be caused by an infection (usually a virus) or an allergic reaction (to medicine, food, insect stings, or many other things). They can also come because of your own body s reaction to things like body temperature changes or pressure on the skin. Sometimes hives are caused by an inherited problem. Hives are not contagious.    Generally, every Emergency Department visit should have a follow-up clinic visit with either a primary or a specialty clinic/provider. Please follow-up as instructed by your emergency provider today.    Return to the Emergency Department if:  You have trouble breathing.  Your lips or tongue swell up, or you have swelling or tightness in the throat.  You have stomach pain or vomiting (throwing up).  You pass out.    What can I do to help myself?  Fill any prescriptions the provider gave you and take them right away.  Antihistamines (H1 blockers) are the primary treatment for hives. You may be given a prescription for an antihistamine, or your provider may tell you to use one available without a prescription, such as Benadryl  (diphenhydramine). These medicines relieve the itching and can help make the hives go away.  You may be given a prescription for a steroid. Used long-term, these can have side effects, but are in the short-term. You may notice restlessness or increased appetite. Be sure to take all of the medicine as prescribed.   Sometimes your provider will recommend an H2 antihistamine, such as Zantac  (ranitidine) or Pepcid  (famotidine). These are usually used for stomach problems, but also can help with hives.   Avoid scratching! This makes the hives get worse. You may want to put socks on your hands (or on your child s hands) when sleeping.  Avoid tight clothes, or clothes  that rub on your skin.  If the itching is too bad, try cool compresses or cool baths. Avoid hot baths and showers, because they can make hives worse.  If your hives were felt to be related to a serious allergic reaction, you may be given an epinephrine auto-injector (such as EpiPen ) to use at home. Use this if you have a serious allergic reaction and call an ambulance right away. This medicine is used to buy time to get to a hospital, but not to replace hospital care.   If you were given a prescription for medicine here today, be sure to read all of the information (including the package insert) that comes with your prescription.  This will include important information about the medicine, its side effects, and any warnings that you need to know about.  The pharmacist who fills the prescription can provide more information and answer questions you may have about the medicine.  If you have questions or concerns that the pharmacist cannot address, please call or return to the Emergency Department.   Remember that you can always come back to the Emergency Department if you are not able to see your regular provider in the amount of time listed above, if you get any new symptoms, or if there is anything that worries you.

## 2021-04-27 NOTE — ED PROVIDER NOTES
History   Chief Complaint:  Allergic Reaction       HPI   Jazlyn Ch is a 25 year old female with history of anemia and CKD who presents with hives and generalized itchiness since 2130. It began as bumps on her hand. She took 50 mg benadryl and 20 mg Pepcid at 2200 without relief. She denies any difficulty breathing, or tongue swelling. She denies any recent new food, detergent, or chemical exposure. She did eat pineapple today, but it was the same pineapple she ate a few days ago.    Review of Systems   Respiratory: Negative for shortness of breath.    Skin: Positive for rash.   All other systems reviewed and are negative.    Allergies:  Banana  Kiwi  Seasonal Allergies    Medications:  The patient is not currently taking any prescribed medications.    Past Medical History:    CKD  Depression with manic behavior    Anemia  Vitamin D deficiency  Carpal tunnel syndrome, bilateral   Obesity     Social History:  Patient presents alone to the ED.  She works as a .    Physical Exam     Patient Vitals for the past 24 hrs:   BP Temp Temp src Pulse Resp SpO2   04/27/21 0012 -- -- -- -- -- 99 %   04/27/21 0004 -- -- -- -- -- 100 %   04/26/21 2355 -- -- -- -- -- 99 %   04/26/21 2315 (!) 146/89 98.3  F (36.8  C) Oral 102 18 100 %       Physical Exam  General: Alert and interactive. Appears well. Anxious. Hyperventilating.   Head: Atraumatic, without obvious lesion, abrasion, hematoma.   Eyes: The pupils are equal and round. No scleral icterus.   ENT: No obvious abnormalities to the ears or nose. Mucous membranes moist. No buccal nor tongue swelling.  Neck:Trachea is in the midline. No obvious swelling to the neck. Full range of motion.   CV: Tachycardia. Extremities well perfused.   Resp: Non-labored, no retractions or accessory muscle use.     GI: Abdomen is not distended.   MS: Moving all extremities well.   Skin: Scattered raised urticarial lesions to abdomen, upper and lower extremities. No rash  to face.   Neuro: Alert and oriented x 3. Non-focal examination.    Psych: Awake. Alert.  Normal affect. Appropriate interactions.    Emergency Department Course     Emergency Department Course:    Reviewed:  I reviewed nursing notes, vitals, past medical history and care everywhere    Assessments:  2325 I obtained history and examined the patient as noted above.   0023 I rechecked the patient and explained findings. I answered all questions prior to discharge.    Interventions:  2341 solu-Medrol 125 mg IV  2341 Ativan 0.5 mg IV    Disposition:  The patient was discharged to home.       Impression & Plan   CMS Diagnoses: None    Medical Decision Making:  Jazlyn Ch is a 25 year old female who presents for evaluation of itching. The patient presents with a rash consistent with urticaria. No recent changes to medications, no new foods, no new detergents or environmental exposures. She was itching and hyperventilating in the room when I first saw her.  I noted through chart review that she has a history of anxiety  An IV was initiated, and the patient was treated with Solu-Medrol and Ativan. Within an hour, the patient felt much improved and was requesting discharge home. It seems that most of her hives have resolved. At no point did the patient have any throat swelling, buccal swelling, tongue swelling, difficulty breathing, or any other signs/symptoms of anaphylaxis. However, I will prescribe her an EpiPen to have in her possession should she develop any symptoms of anaphylaxis. I recommended that she treat herself with Benadryl every 6 hours for the next 3 days and have prescribed a very short course of prednisone. She will return for any worsening rash, itching, breathing issues.    Diagnosis:    ICD-10-CM   1. Urticaria  L50.9   2. Anxiety reaction  F41.1       Discharge Medications:  New Prescriptions    EPINEPHRINE (ANY BX GENERIC EQUIV) 0.3 MG/0.3ML INJECTION 2-PACK    Inject 0.3 mLs (0.3 mg) into the  muscle once as needed for anaphylaxis    PREDNISONE (DELTASONE) 20 MG TABLET    Take two tablets (= 40mg) each day for 3 (three days).       Scribe Disclosure:  I, Peewee Su, am serving as a scribe at 11:14 PM on 4/26/2021 to document services personally performed by Herminia Guerra PA-C based on my observations and the provider's statements to me.            Herminia Guerra PA-C  04/27/21 0113

## 2021-06-03 NOTE — ED NOTES
"    Assessment & Plan     Screen for colon cancer      JUAN (generalized anxiety disorder)    - escitalopram (LEXAPRO) 20 MG tablet; Take 1 tablet (20 mg) by mouth every morning  - ALPRAZolam (XANAX) 1 MG tablet; TAKE 1 TABLET BY MOUTH THREE TIMES A DAY AS NEEDED  - AMBULATORY ADULT DIABETES EDUCATOR REFERRAL; Future             BMI:   Estimated body mass index is 37.66 kg/m  as calculated from the following:    Height as of this encounter: 1.803 m (5' 11\").    Weight as of this encounter: 122.5 kg (270 lb).   Weight management plan: Discussed healthy diet and exercise guidelines    Depression Screening Follow Up    PHQ 6/4/2021   PHQ-9 Total Score 14   Q9: Thoughts of better off dead/self-harm past 2 weeks Not at all     Last PHQ-9 6/4/2021   1.  Little interest or pleasure in doing things 1   2.  Feeling down, depressed, or hopeless 2   3.  Trouble falling or staying asleep, or sleeping too much 3   4.  Feeling tired or having little energy 3   5.  Poor appetite or overeating 2   6.  Feeling bad about yourself 1   7.  Trouble concentrating 2   8.  Moving slowly or restless 0   Q9: Thoughts of better off dead/self-harm past 2 weeks 0   PHQ-9 Total Score 14   Difficulty at work, home, or with people Somewhat difficult       Follow Up Actions Taken       See Patient Instructions    Return in about 4 weeks (around 7/2/2021) for recheck on anxiety and new dose of lexapro, this can bein clinic or virtual.    Eligio Quevedo MD  [facility]    Eric Montenegro is a 51 year old who presents for the following health issues  :    [facility]       Hospital Follow-up Visit:    Hospital/Nursing Home/IP Rehab Facility: [facility]  Date of Admission: 5/20/21  Date of Discharge: 5/26/21  Reason(s) for Admission: Myocardial infarction      Was your hospitalization related to COVID-19? No   Problems taking medications regularly:  None  Medication changes since discharge: yes    Problems adhering to non-medication " Pt report called to [facility]. Pt updated that she will be going to the floor after pharmacy consult.    therapy:  None    Summary of hospitalization:  CareEverywhere information obtained and reviewed  Diagnostic Tests/Treatments reviewed.  Follow up needed: none  Other Healthcare Providers Involved in Patient s Care:         Specialist appointment - cardiology  Update since discharge: improved. Post Discharge Medication Reconciliation: discharge medications reconciled, continue medications without change.  Plan of care communicated with patient              Review of Systems         Objective    There were no vitals taken for this visit.  There is no height or weight on file to calculate BMI.  Physical Exam                 --------------------------------------------------------------------------------------------------------------------------------------  Subjective:  Lan is a 51 year old male who presents today for follow-up on a recent hospitalization at [facility]. He was admitted to the hospital on 5-20-21 and discharged on 5-26. The patient went to the hospital for symptoms of chest pain . He was diagnosed with ACS and cardiogenic shock. The patient was treated with angioplasty and stenting times 1 . He was also given  a permanent pacemaker. He was placed on Bumex He was asked to follow up today specifically to check up on his general well being.  In addition the patient reports the following new symptoms: he is more anxious. He is having a hard time falling asleep. He worries about dying when he goes to bed. The xanax is not as effective as it was.     He has a cardiologist at [facility] [place] cardiology   Dr Campos  He has a laboratory appointment(s) coming up to check the effects of his bumex medication,     Coronary Angiogram 5/20/21:   DIAGNOSTIC SUMMARY    The LMCA is free of significant disease.    100% stenosis in the Proximal LAD    60% stenosis in the Proximal LAD    50% stenosis in the Mid LAD    The Circumflex is free of significant disease.    60% stenosis in the Mid RCA  LEFT VENTRICULAR FUNCTION     Left ventricular ejection fraction visually estimated at 30%. LV Pressure = 109/35.  INTERVENTION    Successful Thrombectomy, Thrombectomy, Drug Eluting Stent, and  4mm x 8mm Balloon to Proximal LAD, post stenosis 0%           Current Outpatient Medications:      ALPRAZolam (XANAX) 0.5 MG tablet, TAKE 1 TABLET BY MOUTH THREE TIMES A DAY AS NEEDED, Disp: 90 tablet, Rfl: 0     amitriptyline (ELAVIL) 10 MG tablet, TAKE 2 TABLETS (20 MG) BY MOUTH AT BEDTIME, Disp: 180 tablet, Rfl: 0     aspirin (ASA) 81 MG tablet, Take 1 tablet (81 mg) by mouth daily, Disp: , Rfl:      clopidogrel (PLAVIX) 75 MG tablet, Take 75 mg by mouth, Disp: , Rfl:      dapagliflozin (FARXIGA) 5 MG TABS tablet, Take 1 tablet (5 mg) by mouth daily, Disp: 90 tablet, Rfl: 1     eletriptan (RELPAX) 40 MG tablet, Take 1 tablet (40 mg) by mouth at onset of headache May repeat in 2 hours as needed but no more than 2 pills in 24 hours., Disp: 12 tablet, Rfl: 5     insulin glargine (LANTUS SOLOSTAR) 100 UNIT/ML pen, Inject 15 Units Subcutaneous, Disp: , Rfl:      losartan (COZAAR) 25 MG tablet, Take 12.5 mg by mouth 2 times daily, Disp: , Rfl:      metoprolol succinate ER (TOPROL-XL) 25 MG 24 hr tablet, Take 12.5 mg by mouth 2 times daily, Disp: , Rfl:      nitroGLYcerin (NITROSTAT) 0.4 MG sublingual tablet, Place 0.4 mg under the tongue, Disp: , Rfl:      ACE/ARB/ARNI NOT PRESCRIBED, INTENTIONAL,, Please choose reason not prescribed, below, Disp: , Rfl:      alcohol swab prep pads, Use to swab area of injection/laura as directed, Disp: 100 each, Rfl: 3     atorvastatin (LIPITOR) 10 MG tablet, Take 1 tablet (10 mg) by mouth At Bedtime, Disp: 90 tablet, Rfl: 3     blood glucose (ACCU-CHEK SMARTVIEW) test strip, 1 strip by In Vitro route 3 times daily, Disp: 3 Box, Rfl: 3     blood glucose (NO BRAND SPECIFIED) test strip, Use to test blood sugar 1 times daily or as directed. To accompany: Blood Glucose Monitor Brands: per insurance., Disp: 100 strip, Rfl:  3     blood glucose calibration (ACCU-CHEK SMARTVIEW CONTROL) solution, 1 drop by In Vitro route as needed, Disp: 1 each, Rfl: 3     blood glucose calibration (NO BRAND SPECIFIED) solution, Use to calibrate blood glucose monitor as needed as directed. To accompany: Blood Glucose Monitor Brands: per insurance., Disp: 1 each, Rfl: 0     blood glucose monitoring (ACCU-CHEK FASTCLIX) lancets, 1 each 3 times daily, Disp: 3 Box, Rfl: 3     blood glucose monitoring (NO BRAND SPECIFIED) meter device kit, Use to test blood sugar 1 times daily or as directed., Disp: 1 kit, Rfl: 0     escitalopram (LEXAPRO) 10 MG tablet, Take 1 tablet (10 mg) by mouth every morning, Disp: 90 tablet, Rfl: 1     ezetimibe (ZETIA) 10 MG tablet, TAKE 1 TABLET BY MOUTH EVERY DAY, Disp: 90 tablet, Rfl: 0     fluticasone (FLONASE) 50 MCG/ACT nasal spray, INSTILL 2 SPRAYS INTO BOTH NOSTRILS DAILY, Disp: 16 mL, Rfl: 3     metFORMIN (GLUCOPHAGE-XR) 500 MG 24 hr tablet, TAKE 1 TABLET BY MOUTH TWICE A DAY WITH MEALS, Disp: 180 tablet, Rfl: 1     metFORMIN (GLUCOPHAGE-XR) 750 MG 24 hr tablet, Take 1 tablet (750 mg) by mouth 2 times daily (with meals), Disp: , Rfl:      omeprazole (PRILOSEC) 20 MG DR capsule, Take 1 capsule (20 mg) by mouth daily, Disp: 30 capsule, Rfl: 3     RABEprazole (ACIPHEX) 20 MG EC tablet, Take 1 tablet (20 mg) by mouth daily, Disp: 90 tablet, Rfl: 1     sildenafil (REVATIO) 20 MG tablet, Take 1 to 5 tabs 30-60 minutes before intercourse.  Never use with nitroglycerin, terazosin or doxazosin., Disp: 30 tablet, Rfl: 11     STATIN NOT PRESCRIBED, INTENTIONAL,, continuous prn for other Reported on 4/6/2017, Disp: , Rfl: 0     thin (NO BRAND SPECIFIED) lancets, Use to test blood sugar 1 times daily or as directed. To accompany: Blood Glucose Monitor Brands: per insurance., Disp: 100 each, Rfl: 3    Past Medical History:   Diagnosis Date     Chronic back pain      Diabetes (H)      Migraine headaches      TINA (obstructive sleep apnea)   "   mild does not use CPAP at present     Seasonal allergies      Tobacco use disorder        OBJECTIVE:  BP 94/60   Pulse 89   Ht 1.803 m (5' 11\")   Wt 122.5 kg (270 lb)   SpO2 98%   BMI 37.66 kg/m    GENERAL APPEARANCE: healthy, alert and no distress    ASSESSMENT:  51 year old male who recently underwent hospitalization for ACS .       Plan: At this time we will continue the present management and defer the majority of it to cardiology.   Regarding his anxiety we will increase the lexapro to 20 mg qam and increase the dose of the Xanax to 1 mg 3 times daily as needed. I asked t the patient return to clinic in 4 weeks. Lan  voiced understanding to informations discussed today.      There are no Patient Instructions on file for this visit.\              "

## 2021-09-21 ENCOUNTER — OFFICE VISIT (OUTPATIENT)
Dept: URGENT CARE | Facility: URGENT CARE | Age: 26
End: 2021-09-21

## 2021-09-21 VITALS
RESPIRATION RATE: 20 BRPM | SYSTOLIC BLOOD PRESSURE: 118 MMHG | BODY MASS INDEX: 32.12 KG/M2 | DIASTOLIC BLOOD PRESSURE: 66 MMHG | WEIGHT: 199 LBS | OXYGEN SATURATION: 99 % | HEART RATE: 73 BPM | TEMPERATURE: 98.5 F

## 2021-09-21 DIAGNOSIS — R05.9 COUGH: ICD-10-CM

## 2021-09-21 DIAGNOSIS — R07.0 THROAT PAIN: Primary | ICD-10-CM

## 2021-09-21 LAB
DEPRECATED S PYO AG THROAT QL EIA: NEGATIVE
GROUP A STREP BY PCR: NOT DETECTED

## 2021-09-21 PROCEDURE — U0003 INFECTIOUS AGENT DETECTION BY NUCLEIC ACID (DNA OR RNA); SEVERE ACUTE RESPIRATORY SYNDROME CORONAVIRUS 2 (SARS-COV-2) (CORONAVIRUS DISEASE [COVID-19]), AMPLIFIED PROBE TECHNIQUE, MAKING USE OF HIGH THROUGHPUT TECHNOLOGIES AS DESCRIBED BY CMS-2020-01-R: HCPCS | Performed by: FAMILY MEDICINE

## 2021-09-21 PROCEDURE — U0005 INFEC AGEN DETEC AMPLI PROBE: HCPCS | Performed by: FAMILY MEDICINE

## 2021-09-21 PROCEDURE — 87651 STREP A DNA AMP PROBE: CPT | Performed by: FAMILY MEDICINE

## 2021-09-21 PROCEDURE — 99203 OFFICE O/P NEW LOW 30 MIN: CPT | Performed by: FAMILY MEDICINE

## 2021-09-21 NOTE — PATIENT INSTRUCTIONS
"Discharge Instructions for COVID-19 Patients  You have--or may have--COVID-19. Please follow the instructions listed below.   If you have a weakened immune system, discuss with your doctor any other actions you need to take.  How can I protect others?  If you have symptoms (fever, cough, body aches or trouble breathing):    Stay home and away from others (self-isolate) until:  ? Your other symptoms have resolved (gotten better). And   ? You've had no fever--and no medicine that reduces fever--for 1 full day (24 hours). And   ? At least 10 days have passed since your symptoms started. (You may need to wait 20 days. Follow the advice of your care team.)  If you don't show symptoms, but testing showed that you have COVID-19:    Stay home and away from others (self-isolate) until at least 10 days have passed since the date of your first positive COVID-19 test.  During this time    Stay in your own room, even for meals. Use your own bathroom if you can.    Stay away from others in your home. No hugging, kissing or shaking hands. No visitors.    Don't go to work, school or anywhere else.    Clean \"high touch\" surfaces often (doorknobs, counters, handles). Use household cleaning spray or wipes.    You'll find a full list of  on the EPA website: www.epa.gov/pesticide-registration/list-n-disinfectants-use-against-sars-cov-2.    Cover your mouth and nose with a mask or other face covering to avoid spreading germs.    Wash your hands and face often. Use soap and water.    Caregivers in these groups are at risk for severe illness due to COVID-19:  ? People 65 years and older  ? People who live in a nursing home or long-term care facility  ? People with chronic disease (lung, heart, cancer, diabetes, kidney, liver, immunologic)  ? People who have a weakened immune system, including those who:    Are in cancer treatment    Take medicine that weakens the immune system, such as corticosteroids    Had a bone marrow or organ " transplant    Have an immune deficiency    Have poorly controlled HIV or AIDS    Are obese (body mass index of 40 or higher)    Smoke regularly    Caregivers should wear gloves while washing dishes, handling laundry and cleaning bedrooms and bathrooms.    Use caution when washing and drying laundry: Don't shake dirty laundry and use the warmest water setting that you can.    For more tips on managing your health at home, go to www.cdc.gov/coronavirus/2019-ncov/downloads/10Things.pdf.  How can I take care of myself at home?  1. Get lots of rest. Drink extra fluids (unless a doctor has told you not to).  2. Take Tylenol (acetaminophen) for fever or pain. If you have liver or kidney problems, ask your family doctor if it's okay to take Tylenol.   Adults can take either:   ? 650 mg (two 325 mg pills) every 4 to 6 hours, or   ? 1,000 mg (two 500 mg pills) every 8 hours as needed.  ? Note: Don't take more than 3,000 mg in one day. Acetaminophen is found in many medicines (both prescribed and over-the-counter medicines). Read all labels to be sure you don't take too much.   For children, check the Tylenol bottle for the right dose. The dose is based on the child's age or weight.  3. If you have other health problems (like cancer, heart failure, an organ transplant or severe kidney disease): Call your specialty clinic if you don't feel better in the next 2 days.  4. Know when to call 911. Emergency warning signs include:  ? Trouble breathing or shortness of breath  ? Pain or pressure in the chest that doesn't go away  ? Feeling confused like you haven't felt before, or not being able to wake up  ? Bluish-colored lips or face  5. Your doctor may have prescribed a blood thinner medicine. Follow their instructions.  Where can I get more information?     [facility] - About COVID-19:   [email or web address]    CDC - What to Do If You're Sick:  www.cdc.gov/coronavirus/2019-ncov/about/steps-when-sick.html    CDC - Ending Home Isolation: www.cdc.gov/coronavirus/2019-ncov/hcp/disposition-in-home-patients.html    CDC - Caring for Someone: www.cdc.gov/coronavirus/2019-ncov/if-you-are-sick/care-for-someone.html    [facility] - Interim Guidance for Hospital Discharge to Home: www.health.[facility].[place]./diseases/coronavirus/hcp/hospdischarge.pdf    Below are the COVID-19 hotlines at the [place] Department of Health ([facility]). Interpreters are available.  ? For health questions: Call 983-115-9378 or 1-355.270.5549 (7 a.m. to 7 p.m.)  ? For questions about schools and childcare: Call 315-613-9336 or 1-307.695.4031 (7 a.m. to 7 p.m.)    For informational purposes only. Not to replace the advice of your health care provider. Clinically reviewed by Dr. Marcello Park.   Copyright   2020 [facility]. All rights reserved. [employer] [id number] - REV 01/05/21.

## 2021-09-22 LAB — SARS-COV-2 RNA RESP QL NAA+PROBE: NEGATIVE

## 2021-10-03 ENCOUNTER — HEALTH MAINTENANCE LETTER (OUTPATIENT)
Age: 26
End: 2021-10-03

## 2021-10-19 NOTE — PROGRESS NOTES
SUBJECTIVE: Jazlyn Ch is a 26 year old female presenting with a chief complaint of cough  and sore throat.  Onset of symptoms was day(s) ago.  Current and Associated symptoms: none  Treatment measures tried include None tried.  Predisposing factors include None.    Past Medical History:   Diagnosis Date     Chlamydia Nov 2014     Chronic kidney disease     kidney stone 2011     Depressive disorder     during previous pregnancy/ situational, postpartum it was diagnosed with second baby.     Gonorrhea  Nov 2014     Allergies   Allergen Reactions     Banana      anaphyaxis       Kiwi      Seasonal Allergies      Social History     Tobacco Use     Smoking status: Never Smoker     Smokeless tobacco: Never Used   Substance Use Topics     Alcohol use: Not Currently       ROS:  SKIN: no rash  GI: no vomiting    OBJECTIVE:  /66   Pulse 73   Temp 98.5  F (36.9  C)   Resp 20   Wt 90.3 kg (199 lb)   LMP  (LMP Unknown)   SpO2 99%   BMI 32.12 kg/m  GENERAL APPEARANCE: healthy, alert and no distress  EYES: EOMI,  PERRL, conjunctiva clear  HENT: ear canals and TM's normal.  Nose and mouth without ulcers, erythema or lesions  RESP: lungs clear to auscultation - no rales, rhonchi or wheezes  SKIN: no suspicious lesions or rashes      ICD-10-CM    1. Throat pain  R07.0 Streptococcus A Rapid Scr w Reflx to PCR - Lab Collect     Streptococcus A Rapid Scr w Reflx to PCR - Lab Collect     Group A Streptococcus PCR Throat Swab   2. Cough  R05 Symptomatic COVID-19 Virus (Coronavirus) by PCR Nose       Fluids/Rest, f/u if worse/not any better     Alert and oriented to person, place, time/situation. normal mood and affect. no apparent risk to self or others.

## 2021-11-28 ENCOUNTER — HEALTH MAINTENANCE LETTER (OUTPATIENT)
Age: 26
End: 2021-11-28

## 2021-11-28 ENCOUNTER — OFFICE VISIT (OUTPATIENT)
Dept: URGENT CARE | Facility: URGENT CARE | Age: 26
End: 2021-11-28
Payer: OTHER GOVERNMENT

## 2021-11-28 VITALS
BODY MASS INDEX: 33.41 KG/M2 | WEIGHT: 207 LBS | HEART RATE: 89 BPM | SYSTOLIC BLOOD PRESSURE: 128 MMHG | DIASTOLIC BLOOD PRESSURE: 78 MMHG | OXYGEN SATURATION: 100 % | TEMPERATURE: 100.5 F | RESPIRATION RATE: 20 BRPM

## 2021-11-28 DIAGNOSIS — R05.9 COUGH: Primary | ICD-10-CM

## 2021-11-28 DIAGNOSIS — J10.1 INFLUENZA A: ICD-10-CM

## 2021-11-28 DIAGNOSIS — R52 BODY ACHES: ICD-10-CM

## 2021-11-28 LAB
DEPRECATED S PYO AG THROAT QL EIA: NEGATIVE
FLUAV AG SPEC QL IA: POSITIVE
FLUBV AG SPEC QL IA: NEGATIVE
GROUP A STREP BY PCR: NOT DETECTED

## 2021-11-28 PROCEDURE — U0005 INFEC AGEN DETEC AMPLI PROBE: HCPCS | Performed by: PHYSICIAN ASSISTANT

## 2021-11-28 PROCEDURE — 87651 STREP A DNA AMP PROBE: CPT | Performed by: PHYSICIAN ASSISTANT

## 2021-11-28 PROCEDURE — 87804 INFLUENZA ASSAY W/OPTIC: CPT | Performed by: PHYSICIAN ASSISTANT

## 2021-11-28 PROCEDURE — U0003 INFECTIOUS AGENT DETECTION BY NUCLEIC ACID (DNA OR RNA); SEVERE ACUTE RESPIRATORY SYNDROME CORONAVIRUS 2 (SARS-COV-2) (CORONAVIRUS DISEASE [COVID-19]), AMPLIFIED PROBE TECHNIQUE, MAKING USE OF HIGH THROUGHPUT TECHNOLOGIES AS DESCRIBED BY CMS-2020-01-R: HCPCS | Performed by: PHYSICIAN ASSISTANT

## 2021-11-28 PROCEDURE — 99213 OFFICE O/P EST LOW 20 MIN: CPT | Performed by: PHYSICIAN ASSISTANT

## 2021-11-28 RX ORDER — DEXTROMETHORPHAN POLISTIREX 30 MG/5ML
60 SUSPENSION ORAL 2 TIMES DAILY
Qty: 148 ML | Refills: 0 | Status: SHIPPED | OUTPATIENT
Start: 2021-11-28

## 2021-11-28 RX ORDER — IBUPROFEN 800 MG/1
800 TABLET, FILM COATED ORAL EVERY 8 HOURS PRN
Qty: 30 TABLET | Refills: 0 | Status: SHIPPED | OUTPATIENT
Start: 2021-11-28 | End: 2022-11-28

## 2021-11-28 RX ORDER — OSELTAMIVIR PHOSPHATE 75 MG/1
75 CAPSULE ORAL DAILY
Qty: 7 CAPSULE | Refills: 0 | Status: SHIPPED | OUTPATIENT
Start: 2021-11-28 | End: 2021-12-05

## 2021-11-28 NOTE — PATIENT INSTRUCTIONS
Patient Education     The Flu (Influenza)  Updated for the 1711-1249 flu season   The flu (influenza) is a viral infection that affects your respiratory tract. The respiratory tract is made up of your mouth, nose, and lungs, and the passages between them. Unlike a cold, the flu can make you very ill. It may lead to pneumonia, a serious lung infection. The flu can have serious complications and even cause death.  Because of the COVID-19 pandemic, experts strongly advise getting a flu vaccine during 7540-2745 to protect yourself, your family, and others. Flu viruses and the COVID-19 virus are both likely to spread during flu season. A flu vaccine will help save medical resources to care for people with COVID-19. People at high risk for complications from the flu are also likely to be at high risk for serious problems from COVID-19, so it's important to get a flu vaccine.     Viruses that cause influenza spread through the air in droplets when someone who has the flu coughs, sneezes, laughs, or talks.   Who is at risk for the flu?  Anyone can get the flu. But you are more likely to become infected if you:    Have a weak immune system    Work in a healthcare setting where you may be exposed to flu germs    Live or work with someone who has the flu    Haven t had the flu vaccine as advised  How does the flu spread?  The flu is caused by a virus. The virus spreads through the air in droplets when someone who has the flu coughs, sneezes, laughs, or talks. You can become infected when you inhale these viruses directly. You can also become infected when you touch a surface on which the droplets have landed and then transfer the germs to your eyes, nose, or mouth. Touching used tissues, or sharing utensils, drinking glasses, or a toothbrush from an infected person can expose you to flu viruses, too.  What are the symptoms of the flu?  Flu symptoms tend to come on quickly and may last a few days to a few weeks. They  include:    Fever usually higher than 100.4  F  ( 38 C ) and chills    Sore throat and headache    Dry cough    Runny nose    Tiredness and weakness    Muscle aches  Who is at risk for flu complications?  For some people, the flu can be very serious. The risk for complications is greater for:    Children younger than age 5    Adults ages 65 and older    People with a chronic illness such as diabetes or heart, kidney, or lung disease    People with a weak immune system such as those with HIV, AIDS, or cancer,or those who have had a transplant or are taking immune suppressing medicines    People who live in a nursing home or long-term care facility  How is the flu treated?  The flu usually gets better after 7 days or so. In some cases, your healthcare provider may prescribe an antiviral medicine. This may help you get well sooner. It also may reduce the risk for and severity of complications. For the medicine to help, you need to take it as soon as possible (ideally within 48 hours) after your symptoms start.  If you develop pneumonia or other serious illness, you may need to stay in the hospital.  Easing flu symptoms    Drink lots of fluids such as water, juice, and warm soup. A good rule is to drink enough so that you urinate your normal amount.    Get plenty of rest.    Ask your healthcare provider what to take for fever and pain. Don't give aspirin to children under 18 years of age. It can cause the serious illness Reye syndrome.    Call your provider if your fever is 100.4  F ( 38 C ) or higher, or you become dizzy, lightheaded, or short of breath.    Taking steps to protect others    Wash your hands often, especially after coughing or sneezing. Or clean your hands with an alcohol-based hand  containing at least 60% alcohol.    Cough or sneeze into a tissue. Then throw the tissue away and wash your hands. If you don t have a tissue, cough and sneeze into your elbow.    Stay home until at least 24 hours  after you no longer have a fever or chills. Be sure the fever isn t being hidden by fever-reducing medicine.    Don t share food, utensils, drinking glasses, or a toothbrush with others.    How can the flu be prevented?    One of the best ways to prevent the flu is to get a flu vaccine each year. The CDC and American Academy of Pediatrics recommend that all people 6 months of age and older get a flu vaccine every year. This includes pregnant women. Healthcare providers advise getting the flu vaccine each year as soon as it's available in your area.    Flu virus strains change from year to year, so the vaccine changes yearly to help prevent flu viruses predicted to cause illness during the upcoming flu season. For the 5319-2635 influenza season, the vaccine is available in different forms. It's most often given as a shot into the muscle. A nasal spray is available for healthy, non-pregnant people between ages 2 and 49 years. A needle-free form called a jet injector delivers the vaccine through the skin into the muscle through a high-pressure stream. This form may be an option for some people ages 18 to 64. Your healthcare provider can tell you which vaccine is right for you.    Wash your hands often. Frequent handwashing is a proven way to help prevent the spread of infection.    Carry an alcohol-based hand gel containing at least 60% alcohol. Use it when you can't use soap and water. Then wash your hands as soon as you can.    Try not to touch your eyes, nose, or mouth.    At home and work, clean phones, computer keyboards, and toys often with disinfectant wipes.    If possible, don't have close contact with others who have the flu or symptoms of the flu.    Handwashing tips  Handwashing is one of the best ways to prevent many common infections. If you are caring for or visiting someone with the flu, wash your hands each time you enter and leave the room. Follow these steps:    Use clean, running water and plenty of  soap. Rub your hands together well.    Clean the whole hand, including under your nails, between your fingers, and up the wrists.    Wash for at least 20 seconds.    Rinse, letting the water run down your fingers, not up your wrists.    Dry your hands well. Use a paper towel to turn off the faucet and open the door.  Using alcohol-based hand   Alcohol-based hand  are also a good choice. Use them when you can't use soap and water. Follow these steps:    Apply enough of the cleanser on your hands to cover all surfaces.    Rub your hands together briskly, cleaning the backs of your hands, the palms, between your fingers, and up the wrists.    Rub until the gel is gone and your hands are completely dry.  Preventing the flu in healthcare settings   The flu is a special concern for people in hospitals and long-term care facilities. To help prevent the spread of flu, many hospitals and nursing homes take these steps:    Healthcare providers wash their hands or use an alcohol-based hand  before and after treating each patient.    People with the flu have private rooms and bathrooms or share a room with someone with the same infection.    People who are at high risk for the flu but don't have it are encouraged to get the flu and pneumonia vaccines.    All healthcare workers are encouraged or required to get flu shots.  [employer] last reviewed this educational content on 4/1/2020 2000-2021 [employer]. All rights reserved. This information is not intended as a substitute for professional medical care. Always follow your healthcare professional's instructions.

## 2021-11-28 NOTE — LETTER
[facility]  [address] [address]  Phone: [phone]    November 28, 2021      RE: Jazlyn hC  [address] [place] [address]       To whom it may concern:    Jazlyn Ch was seen in our clinic today. Patient will be off from today 11/28/2021 to Friday 12/03/2021, due to being ill.     Sincerely,      Ayan Olivarez PA-C

## 2021-11-28 NOTE — PROGRESS NOTES
"  Assessment & Plan     Cough  Positive for influenza A  Delsym for coughing  covid pending, check my chart  - Symptomatic COVID-19 Virus (Coronavirus) by PCR Nose  - Streptococcus A Rapid Scr w Reflx to PCR - Lab Collect  - Group A Streptococcus PCR Throat Swab  - Influenza A/B antigen  - dextromethorphan (DELSYM) 30 MG/5ML liquid; Take 10 mLs (60 mg) by mouth 2 times daily    Influenza A  tamiflu  - oseltamivir (TAMIFLU) 75 MG capsule; Take 1 capsule (75 mg) by mouth daily for 7 days    Body aches  motrin  - ibuprofen (ADVIL/MOTRIN) 800 MG tablet; Take 1 tablet (800 mg) by mouth every 8 hours as needed    Review of external notes as documented elsewhere in note  30 minutes spent on the date of the encounter doing chart review, review of outside records, review of test results, interpretation of tests, patient visit and documentation        BMI:   Estimated body mass index is 33.41 kg/m  as calculated from the following:    Height as of 2/21/21: 1.676 m (5' 6\").    Weight as of this encounter: 93.9 kg (207 lb).       No follow-ups on file.    Ayan Olivarez PA-C  [facility]    Subjective   Jazlyn Delmy Ann is a 26 year old who presents for the following health issues     HPI     Fever  Cough  Body aches    Review of Systems   Constitutional, HEENT, cardiovascular, pulmonary, gi and gu systems are negative, except as otherwise noted.      Objective    /78   Pulse 89   Temp (!) 100.5  F (38.1  C)   Resp 20   Wt 93.9 kg (207 lb)   LMP  (LMP Unknown)   SpO2 100%   BMI 33.41 kg/m    Body mass index is 33.41 kg/m .  Physical Exam   GENERAL: healthy, alert and no distress  EYES: Eyes grossly normal to inspection, PERRL and conjunctivae and sclerae normal  HENT: ear canals and TM's normal, nose and mouth without ulcers or lesions  NECK: no adenopathy, no asymmetry, masses, or scars and thyroid normal to palpation  RESP: lungs clear to auscultation - no rales, rhonchi or " wheezes  BREAST: normal without masses, tenderness or nipple discharge and no palpable axillary masses or adenopathy  CV: regular rate and rhythm, normal S1 S2, no S3 or S4, no murmur, click or rub, no peripheral edema and peripheral pulses strong  MS: no gross musculoskeletal defects noted, no edema  SKIN: no suspicious lesions or rashes      Results for orders placed or performed in visit on 11/28/21   Streptococcus A Rapid Scr w Reflx to PCR - Lab Collect     Status: Normal    Specimen: Throat; Swab   Result Value Ref Range    Group A Strep antigen Negative Negative   Influenza A/B antigen     Status: Abnormal    Specimen: Nose; Swab   Result Value Ref Range    Influenza A antigen Positive (A) Negative    Influenza B antigen Negative Negative    Narrative    Test results must be correlated with clinical data. If necessary, results should be confirmed by a molecular assay or viral culture.

## 2021-11-29 LAB — SARS-COV-2 RNA RESP QL NAA+PROBE: NEGATIVE

## 2021-12-11 ENCOUNTER — NURSE TRIAGE (OUTPATIENT)
Dept: NURSING | Facility: CLINIC | Age: 26
End: 2021-12-11

## 2021-12-11 NOTE — TELEPHONE ENCOUNTER
Called patient and told her forms are ready for . Told her will leave at Urgent Care desk.  Lilliana Boothe LPN

## 2021-12-11 NOTE — TELEPHONE ENCOUNTER
Patient states that she dropped off some forms for short term disability. Patient works for [employer] and if she is off of work she has to have these forms filled out for work    Forms Request  Name of From/paperwork: disability   Have you been seen for this request? The patient was in UC on 11/28/2021  Do we have the form? Patient dropped off the forms on 12/7/2021  When is the form needed by? The patient MUST have the forms by 12/18/2021  How would you like the form returned? The patient would like to  the forms when they are complete  Fax Number: n/a  Patient notified from requests are processed in 3-5 business days.  yes  Okay to leave a detailed message? Yes at [phone]    Gena Velásquez RN  [place] Nurse Advisor  11:41 AM  12/11/2021

## 2022-05-15 ENCOUNTER — HEALTH MAINTENANCE LETTER (OUTPATIENT)
Age: 27
End: 2022-05-15

## 2022-09-10 ENCOUNTER — HEALTH MAINTENANCE LETTER (OUTPATIENT)
Age: 27
End: 2022-09-10

## 2023-03-10 NOTE — PROGRESS NOTES
SUBJECTIVE:   Jazlyn Ch is a 22 year old female presenting with a chief complaint of wanting to confirm pregnancy.    Some nausea, no vomiting    LMP approximately 3/7/18      Past Medical History:   Diagnosis Date     Chlamydia 2014     Chronic kidney disease     kidney stone      Depressive disorder     during previous pregnancy/ situational, postpartum it was diagnosed with second baby.     Gonorrhea  2014     Patient Active Problem List   Diagnosis     Fall     Supervision of normal IUP (intrauterine pregnancy) in multigravida     Subchorionic hemorrhage in first trimester     Positive GBS test     Encounter for triage in pregnant patient     Indication for care in labor or delivery      (spontaneous vaginal delivery)     Social History   Substance Use Topics     Smoking status: Never Smoker     Smokeless tobacco: Never Used     Alcohol use Yes       ROS:  CONSTITUTIONAL:NEGATIVE for fever, chills, change in weight  RESP:NEGATIVE for significant cough or SOB  CV: NEGATIVE for chest pain, palpitations or peripheral edema  GI: as per HPI  : as per HPI    OBJECTIVE  :/52 (BP Location: Right arm, Patient Position: Chair)  Pulse 66  Temp 98.1  F (36.7  C) (Oral)  Resp 12  Wt 228 lb 12.8 oz (103.8 kg)  SpO2 99%  BMI 38.07 kg/m2  GENERAL APPEARANCE: healthy, alert and no distress  RESP: lungs clear to auscultation - no rales, rhonchi or wheezes  CV: regular rates and rhythm, normal S1 S2, no murmur noted  SKIN: no suspicious lesions or rashes      2.01) Pregnancy test positive  (primary encounter diagnosis)  Comment:   Plan: Prenatal Vit-Fe Fumarate-FA (PRENATAL         MULTIVITAMIN PLUS IRON) 27-0.8 MG TABS per         tablet            (N91.2) Absence of menstruation  Comment:   Plan: Beta HCG qual IFA urine            Establish care with OB.    Patient expresses understanding and agreement with the assessment and plan as above.       Double O-Z Plasty Text: Because of the full-thickness nature of the defect and location adjacent to important structure(s), a bilateral rotation flap (double O to Z) was planned. After prep and anesthesia, arcuate incisions were extended from two opposite side of the wound.  Two flaps were created by undermining in the subcutaneous plane. After hemostasis was obtained, the flaps were advanced and sutured in a layered fashion.

## 2023-03-15 ENCOUNTER — HOSPITAL ENCOUNTER (OUTPATIENT)
Facility: CLINIC | Age: 28
Setting detail: OBSERVATION
Discharge: HOME | End: 2023-03-16
Attending: EMERGENCY MEDICINE | Admitting: NURSE PRACTITIONER

## 2023-03-15 DIAGNOSIS — F33.2 SEVERE EPISODE OF RECURRENT MAJOR DEPRESSIVE DISORDER, WITHOUT PSYCHOTIC FEATURES (H): ICD-10-CM

## 2023-03-15 DIAGNOSIS — R45.851 SUICIDAL THOUGHTS: ICD-10-CM

## 2023-03-15 LAB — SARS-COV-2 RNA RESP QL NAA+PROBE: NEGATIVE

## 2023-03-15 PROCEDURE — U0003 INFECTIOUS AGENT DETECTION BY NUCLEIC ACID (DNA OR RNA); SEVERE ACUTE RESPIRATORY SYNDROME CORONAVIRUS 2 (SARS-COV-2) (CORONAVIRUS DISEASE [COVID-19]), AMPLIFIED PROBE TECHNIQUE, MAKING USE OF HIGH THROUGHPUT TECHNOLOGIES AS DESCRIBED BY CMS-2020-01-R: HCPCS | Performed by: EMERGENCY MEDICINE

## 2023-03-15 PROCEDURE — 250N000013 HC RX MED GY IP 250 OP 250 PS 637: Performed by: NURSE PRACTITIONER

## 2023-03-15 PROCEDURE — C9803 HOPD COVID-19 SPEC COLLECT: HCPCS

## 2023-03-15 PROCEDURE — 99285 EMERGENCY DEPT VISIT HI MDM: CPT | Mod: 25

## 2023-03-15 PROCEDURE — 90791 PSYCH DIAGNOSTIC EVALUATION: CPT

## 2023-03-15 PROCEDURE — G0378 HOSPITAL OBSERVATION PER HR: HCPCS

## 2023-03-15 PROCEDURE — 99223 1ST HOSP IP/OBS HIGH 75: CPT | Performed by: NURSE PRACTITIONER

## 2023-03-15 RX ORDER — TRAZODONE HYDROCHLORIDE 50 MG/1
50 TABLET, FILM COATED ORAL
Status: DISCONTINUED | OUTPATIENT
Start: 2023-03-15 | End: 2023-03-16 | Stop reason: HOSPADM

## 2023-03-15 RX ORDER — ACETAMINOPHEN 325 MG/1
650 TABLET ORAL EVERY 6 HOURS PRN
Status: DISCONTINUED | OUTPATIENT
Start: 2023-03-15 | End: 2023-03-16 | Stop reason: HOSPADM

## 2023-03-15 RX ORDER — OLANZAPINE 5 MG/1
5-10 TABLET, ORALLY DISINTEGRATING ORAL EVERY 6 HOURS PRN
Status: DISCONTINUED | OUTPATIENT
Start: 2023-03-15 | End: 2023-03-16 | Stop reason: HOSPADM

## 2023-03-15 RX ORDER — HYDROXYZINE HYDROCHLORIDE 25 MG/1
25-50 TABLET, FILM COATED ORAL EVERY 6 HOURS PRN
Status: DISCONTINUED | OUTPATIENT
Start: 2023-03-15 | End: 2023-03-16 | Stop reason: HOSPADM

## 2023-03-15 RX ADMIN — TRAZODONE HYDROCHLORIDE 50 MG: 50 TABLET ORAL at 23:23

## 2023-03-15 RX ADMIN — ACETAMINOPHEN 650 MG: 325 TABLET, FILM COATED ORAL at 21:13

## 2023-03-15 ASSESSMENT — COLUMBIA-SUICIDE SEVERITY RATING SCALE - C-SSRS
5. HAVE YOU STARTED TO WORK OUT OR WORKED OUT THE DETAILS OF HOW TO KILL YOURSELF? DO YOU INTEND TO CARRY OUT THIS PLAN?: YES
REASONS FOR IDEATION PAST MONTH: COMPLETELY TO END OR STOP THE PAIN (YOU COULDN'T GO ON LIVING WITH THE PAIN OR HOW YOU WERE FEELING)
TOTAL  NUMBER OF INTERRUPTED ATTEMPTS LIFETIME: NO
2. HAVE YOU ACTUALLY HAD ANY THOUGHTS OF KILLING YOURSELF?: YES
4. HAVE YOU HAD THESE THOUGHTS AND HAD SOME INTENTION OF ACTING ON THEM?: YES
1. IN THE PAST MONTH, HAVE YOU WISHED YOU WERE DEAD OR WISHED YOU COULD GO TO SLEEP AND NOT WAKE UP?: YES
ATTEMPT LIFETIME: NO
6. HAVE YOU EVER DONE ANYTHING, STARTED TO DO ANYTHING, OR PREPARED TO DO ANYTHING TO END YOUR LIFE?: NO
TOTAL  NUMBER OF ABORTED OR SELF INTERRUPTED ATTEMPTS LIFETIME: NO
REASONS FOR IDEATION LIFETIME: COMPLETELY TO END OR STOP THE PAIN (YOU COULDN'T GO ON LIVING WITH THE PAIN OR HOW YOU WERE FEELING)
4. HAVE YOU HAD THESE THOUGHTS AND HAD SOME INTENTION OF ACTING ON THEM?: YES
5. HAVE YOU STARTED TO WORK OUT OR WORKED OUT THE DETAILS OF HOW TO KILL YOURSELF? DO YOU INTEND TO CARRY OUT THIS PLAN?: YES
1. HAVE YOU WISHED YOU WERE DEAD OR WISHED YOU COULD GO TO SLEEP AND NOT WAKE UP?: YES
2. HAVE YOU ACTUALLY HAD ANY THOUGHTS OF KILLING YOURSELF?: YES
3. HAVE YOU BEEN THINKING ABOUT HOW YOU MIGHT KILL YOURSELF?: YES

## 2023-03-15 ASSESSMENT — ENCOUNTER SYMPTOMS
HALLUCINATIONS: 1
SLEEP DISTURBANCE: 1
DYSPHORIC MOOD: 1

## 2023-03-15 ASSESSMENT — ACTIVITIES OF DAILY LIVING (ADL)
ADLS_ACUITY_SCORE: 37

## 2023-03-15 NOTE — Clinical Note
Jazlyn Ch was seen and treated in our emergency department on 3/15/2023.  She may return to work on 03/20/2023.       If you have any questions or concerns, please don't hesitate to call.      Emmy Monreal, [facility]

## 2023-03-15 NOTE — ED TRIAGE NOTES
Pt presents to triage wanting  resources, pt tearful. Agrees to check in for empath. SI, no plan. Pt here voluntarily     Triage Assessment       Row Name 03/15/23 0770       Triage Assessment (Adult)    Airway WDL WDL       Skin Circulation/Temperature WDL    Skin Circulation/Temperature WDL WDL       Cardiac WDL    Cardiac WDL WDL

## 2023-03-15 NOTE — Clinical Note
Jazlyn Ch was seen and treated in our emergency department on 3/15/2023.  She may return to work on 03/20/2023.       If you have any questions or concerns, please don't hesitate to call.      Emmy Monreal, [facility]

## 2023-03-15 NOTE — ED PROVIDER NOTES
"  History     Chief Complaint:  Mental health     The history is provided by the patient.      Jazlyn Ch is a 27 year old female with a history of manic behavior (2019 hospitalization) and depression who presents wanting mental health resources. Jazlyn reports 4 years ago she had a \"schizophrenic episode\" in which she was increasingly paranoid and had difficulty deciphering what was real and what was a hallucination. At that time, she was admitted to the hospital and prescribed medications. However shortly after discharge she gave her medications to a man on the street and has not had any medications since. aJzlyn was smoking and consuming alcohol regularly but stopped in March 2022 (no alcohol in last 10 days). She had been doing well until receiving a promotion at work last April, which put her under an increased amount of stress and she began to drink again. Her job requires communicating with various people and for the last month, she has felt like people have been lying about things she has or hasn't said, but has been unable to decipher if these things are reality or paranoia and hallucinations similar to her episode 4 years ago. With these feelings, Jazlyn has felt increasingly depressed and as if \"no one can save [her]\". She has been having suicidal ideation and often thinks about closing the garage and leaving her car running, but has no set plan to harm herself. She has only been sleeping 4 to 5 hours a night due to her long work hours. She denies homicidal ideation. She is not currently seeing a therapist or a psychiatrist.    Independent Historian: As above.    Review of External Notes: Hospitalization for psychosis/jason in 2019.    ROS:  Review of Systems   Psychiatric/Behavioral: Positive for dysphoric mood, hallucinations (patient uncertain), sleep disturbance and suicidal ideas.        (-) homicidal ideations   All other systems reviewed and are negative.    Allergies:  She has no known drug " allergies.    Medications:    She is not currently taking any prescribed medications.    Past Medical History:    Manic behavior  STD  CKD  Depression  Denies history of diabetes or hypertension    Past Family History:  Heart failure    Social History:  She presents alone.  She uses alcohol.  She denies illicit drug use.  She was employed with a promotion within the last year.    Physical Exam     Patient Vitals for the past 24 hrs:   BP Temp Temp src Pulse Resp SpO2 Height Weight   03/15/23 1747 (!) 142/80 -- -- -- -- -- -- --   03/15/23 1743 -- 97.1  F (36.2  C) Temporal 65 18 100 % -- --      Physical Exam  General: WD/WN; well appearing young -American woman; cooperative  Head:  Atraumatic  Eyes:  Conjunctivae, lids, and sclerae are normal  Neck:  Supple; normal ROM  Resp:  No respiratory distress  GI:  Nondistended    MS:  Normal ROM  Skin:  Warm; non-diaphoretic; no pallor  Neuro: Awake; A&Ox3  Psych:  Tearful, dysphoric mood and affect; normal speech; not responding to internal stimuli; paranoia; suicidal thought content with plan  Vitals reviewed.    Emergency Department Course   Laboratory:  Labs Ordered and Resulted from Time of ED Arrival to Time of ED Departure   COVID-19 VIRUS (CORONAVIRUS) BY PCR - Normal       Result Value    SARS CoV2 PCR Negative        Emergency Department Course & Assessments:    PSS-3    Date and Time Over the past 2 weeks have you felt down, depressed, or hopeless? Over the past 2 weeks have you had thoughts of killing yourself? Have you ever attempted to kill yourself? When did this last happen? User   03/15/23 1741 yes yes no -- BFR      C-SSRS ([place])    Date and Time Q1 Wished to be Dead (Past Month) Q2 Suicidal Thoughts (Past Month) Q3 Suicidal Thought Method Q4 Suicidal Intent without Specific Plan Q5 Suicide Intent with Specific Plan Q6 Suicide Behavior (Lifetime) Within the Past 3 Months? RETIRED: Level of Risk per Screen Screening Not Complete User   03/15/23  1912 yes -- yes yes no no -- -- -- DM   03/15/23 1741 yes yes no no no no -- -- -- BFR        To be completed in [facility].    Assessments:  1800: I obtained history from the patient and performed physical exam, as above.    Independent Interpretation (X-rays, CTs, rhythm strip):  Not applicable.    Social Determinants of Health affecting care:   Employed.  No established mental health care providers.    Disposition:  She was transferred to [facility].     Impression & Plan    Medical Decision Making:  Jazlyn is a 27 year old woman who had an episode of psychosis in 2019 for which she self discontinued medications.  Recently she has been having some similar symptoms which she describes as being unable to be certain if events are real or hallucinations and being increasingly paranoid.  She describes increased stress and alcohol use with decreased sleep.  She does have suicidal thoughts (and plan) without intent.  She requests mental health resources but would like to know if there is a test where she does not have to talk with someone for diagnosis.  She has no other medical complaints and appears well, though tearful and dysphoric.  She does seem paranoid but is not responding to internal stimuli.  At this point she is medically cleared for, and in need of, [facility] thorough [facility]  After she was deemed COVID-19 negative she was sent there without issue.  She verbalized understanding of plan and is amenable.    Diagnosis:    ICD-10-CM    1. Paranoia (H)  F22       2. Suicidal thoughts  R45.851          Scribe Disclosure:  I, Adenike Richardlindareece, am serving as a scribe at 7:31 PM on 3/15/2023 to document services personally performed by Pearl Villalobos MD based on my observations and the provider's statements to me.      3/15/2023   Pearl Villalobos MD Dixson, Kylie S, MD  04/09/23 2034

## 2023-03-16 VITALS
WEIGHT: 217.7 LBS | BODY MASS INDEX: 34.99 KG/M2 | TEMPERATURE: 98.2 F | SYSTOLIC BLOOD PRESSURE: 113 MMHG | HEIGHT: 66 IN | OXYGEN SATURATION: 99 % | HEART RATE: 72 BPM | DIASTOLIC BLOOD PRESSURE: 84 MMHG | RESPIRATION RATE: 16 BRPM

## 2023-03-16 PROCEDURE — G0378 HOSPITAL OBSERVATION PER HR: HCPCS

## 2023-03-16 PROCEDURE — 99239 HOSP IP/OBS DSCHRG MGMT >30: CPT | Performed by: NURSE PRACTITIONER

## 2023-03-16 PROCEDURE — 250N000013 HC RX MED GY IP 250 OP 250 PS 637: Performed by: NURSE PRACTITIONER

## 2023-03-16 RX ORDER — TRAZODONE HYDROCHLORIDE 50 MG/1
50 TABLET, FILM COATED ORAL
Qty: 30 TABLET | Refills: 0 | Status: SHIPPED | OUTPATIENT
Start: 2023-03-16

## 2023-03-16 RX ORDER — HYDROXYZINE HYDROCHLORIDE 25 MG/1
25-50 TABLET, FILM COATED ORAL EVERY 6 HOURS PRN
Qty: 30 TABLET | Refills: 0 | Status: SHIPPED | OUTPATIENT
Start: 2023-03-16

## 2023-03-16 RX ADMIN — TRAZODONE HYDROCHLORIDE 50 MG: 50 TABLET ORAL at 01:14

## 2023-03-16 RX ADMIN — FLUOXETINE 20 MG: 20 CAPSULE ORAL at 09:35

## 2023-03-16 RX ADMIN — HYDROXYZINE HYDROCHLORIDE 50 MG: 25 TABLET, FILM COATED ORAL at 09:35

## 2023-03-16 ASSESSMENT — COLUMBIA-SUICIDE SEVERITY RATING SCALE - C-SSRS
TOTAL  NUMBER OF INTERRUPTED ATTEMPTS SINCE LAST CONTACT: NO
6. HAVE YOU EVER DONE ANYTHING, STARTED TO DO ANYTHING, OR PREPARED TO DO ANYTHING TO END YOUR LIFE?: NO
2. HAVE YOU ACTUALLY HAD ANY THOUGHTS OF KILLING YOURSELF?: NO
SUICIDE, SINCE LAST CONTACT: NO
ATTEMPT SINCE LAST CONTACT: NO
TOTAL  NUMBER OF ABORTED OR SELF INTERRUPTED ATTEMPTS SINCE LAST CONTACT: NO
1. SINCE LAST CONTACT, HAVE YOU WISHED YOU WERE DEAD OR WISHED YOU COULD GO TO SLEEP AND NOT WAKE UP?: NO

## 2023-03-16 ASSESSMENT — ACTIVITIES OF DAILY LIVING (ADL)
ADLS_ACUITY_SCORE: 37

## 2023-03-16 NOTE — PROGRESS NOTES
Pt was restless and pacing the unit, unable to sleep, repeat dose of trazodone 50 mg po given and pt is now asleep.

## 2023-03-16 NOTE — PROGRESS NOTES
"Patient has been isolating to chair most of shift, intermittently sleeping and reading magazine. Pt appears depressed and anxious, observed constantly foot tapping in chair but patient reports her mood is \"better than yesterday\". Blunted, flat, guarded affect. Soft-spoken, nearly whispering. Patient responds to most questions with one word answers. Observed staring at the floor during conversation.     Pt agreeable to taking hydroxyzine this morning which was given at 0935. Reports minimal benefit, noting it made her drowsy.     Pt eating very little today, declined to eat breakfast. Encouraged to order lunch however only ordered broccoli. Drinking adequate fluids.     Awaiting LMHP and provider reassessment today.   "

## 2023-03-16 NOTE — CONSULTS
"Diagnostic Evaluation Consultation  Crisis Assessment    Patient was assessed: In Person  Patient location: [facility]  Was a release of information signed: No. Reason: n/a      Referral Data and Chief Complaint  Jazlyn is a 27 year old, who uses she/her pronouns, and presents to the ED alone. Patient is referred to the ED by self. Patient is presenting to the ED for the following concerns: suicidal ideation.      Informed Consent and Assessment Methods     Patient is her own guardian. Writer met with patient and explained the crisis assessment process, including applicable information disclosures and limits to confidentiality, assessed understanding of the process, and obtained consent to proceed with the assessment. Patient was observed to be able to participate in the assessment as evidenced by verbal understanidng of the assessment process. Assessment methods included conducting a formal interview with patient, review of medical records, collaboration with medical staff, and obtaining relevant collateral information from family and community providers when available..     Over the course of this crisis assessment provided reassurance, offered validation and engaged patient in problem solving and disposition planning. Patient's response to interventions was engaged     Summary of Patient Situation  Pt reports that she has been feeling increasingly depressed and suicidal.  She reported to the RN today that she had to miss her son's ASD evaluation due to working which only increased her ideation.  Pt states that she had thoughts of crashing her car or CO poisoning but decided to come to the ED to see if there were any \"options left\".  Pt denies any previous attempts, self harm or homicidal ideation.  Pt reports active intent.  Pt states that she is torn between leaving her children, but just states she feels like she needs to die or \"start over in a new brain\".     Pt reports that over the last 6 months she has been " feeling increasingly overwhelmed and depressed.  Pt states that work has been overwhelming and she feels like she is having a rudolph in her brain of constantly trying to figure out if she is paranoid or if she is being gaslite at work.   Pt states that in combination with working overtime she doesn't have any time left to spend with her family or children.  Pt denies that she feels supported.    Pt denies any psychosis, jason, or delusional thinking.  Pt presents as alert, oriented, calm and cooperative.  Pt is tearful, flat, and makes minimal eye contact.    Brief Psychosocial History  Pt reports that she lives at home with her  and mother in law. They have 3 children.  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  Pt states that due to these they were able to buy an expensive house that she doesn't want/like.  Pt reports that she works at [employer] which is incredibly stressful.  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   Pt additionally reports that she works more than full time, often 3rd shift, often on her off days and all weekend.  Pt states that she doesn't feel like her primary care giver for her children.  Pt additionally notes to the RN that her mom passed in 2019 prior to her hospitalization and she con't to have a significant amount of grief and loss.    Significant Clinical History  Pt carries a previous hospitalization with noted stressed induced jason.  Pt notes that this was her only hospitalization/ED visit.    Pt saw an EAP professional once.  Pt denies that outpatient therapy or psychiatry.  Pt notes that she doesn't think thinks will be helpful.  Pt states that she doesn't like how the Zyprexa made her feel and she stopped taking it after the hospitalization.     Pt notes THC  "and ETOH use.  Pt notes ETOH use that was daily in nature for a period of time.  Pt desires to stop drinking and \"leave that behind\".     Collateral Information  Writer left  for pt's spouse, listed as Dima at  requesting a call back     Risk Assessment  [place] Suicide Severity Rating Scale Full Clinical Version:3/15/2023  Suicidal Ideation  1. Wish to be Dead (Lifetime): Yes  1. Wish to be Dead (Past 1 Month): Yes  2. Non-Specific Active Suicidal Thoughts (Lifetime): Yes  2. Non-Specific Active Suicidal Thoughts (Past 1 Month): Yes  3. Active Suicidal Ideation with any Methods (Not Plan) Without Intent to Act (Lifetime): Yes  3. Active Suicidal Ideation with any Methods (Not Plan) Without Intent to Act (Past 1 Month): Yes  4. Active Suicidal Ideation with Some Intent to Act, Without Specific Plan (Lifetime): Yes  4. Active Suicidal Ideation with Some Intent to Act, Without Specific Plan (Past 1 Month): Yes  5. Active Suicidal Ideation with Specific Plan and Intent (Lifetime): Yes  5. Active Suicidal Ideation with Specific Plan and Intent (Past 1 Month): Yes  Intensity of Ideation  Most Severe Ideation Rating (Lifetime): 4  Most Severe Ideation Rating (Past 1 Month): 4  Frequency (Lifetime): Many times each day  Frequency (Past 1 Month): Many times each day  Duration (Lifetime): 4-8 hours/most of day  Duration (Past 1 Month): 4-8 hours/most of day  Controllability (Lifetime): Can control thoughts with a lot of difficulty  Controllability (Past 1 Month): Can control thoughts with a lot of difficulty  Deterrents (Lifetime): Deterrents probably stopped you  Deterrents (Past 1 Month): Deterrents probably stopped you  Reasons for Ideation (Lifetime): Completely to end or stop the pain (You couldn't go on living with the pain or how you were feeling)  Reasons for Ideation (Past 1 Month): Completely to end or stop the pain (You couldn't go on living with the pain or how you were feeling)  Suicidal " Behavior  Actual Attempt (Lifetime): No  Has subject engaged in non-suicidal self-injurious behavior? (Lifetime): No  Interrupted Attempts (Lifetime): No  Aborted or Self-Interrupted Attempt (Lifetime): No  Preparatory Acts or Behavior (Lifetime): No  C-SSRS Risk (Lifetime/Recent)  Calculated C-SSRS Risk Score (Lifetime/Recent): High Risk     Validity of evaluation is not impacted by presenting factors during interview.   Comments regarding subjective versus objective responses to [place] tool: n/a  Environmental or Psychosocial Events: loss of a loved one, work or task failure, challenging interpersonal relationships, geographic isolation from supports, barriers to accessing healthcare, helplessness/hopelessness and ongoing abuse of substances  Chronic Risk Factors: history of psychiatric hospitalization and chronic and ongoing sleep difficulties   Warning Signs: seeking access to means to hurt or kill self, talking or writing about death, dying, or suicide, hopelessness, feeling trapped, like there is no way out, increasing substance use or abuse, withdrawing from friends, family, and society, anxiety, agitation, unable to sleep, sleeping all the time, dramatic changes in mood and no reason for living, no sense of purpose in life  Protective Factors: intact marriage or domestic partnership, responsibilities and duties to others, including pets and children, lives in a responsibly safe and stable environment and help seeking  Interpretation of Risk Scoring, Risk Mitigation Interventions and Safety Plan:  Pt is currently scored as high risk due to her reported suicidal ideation, intent, and planning.  Pt notes that she wanted to see if there was medications to help stabilize her but she is not interested in outpatient treatment.  Pt denies active insurance for support.  Pt notes that her only protective factor is leaving her children, but she states that she doesn't feel like a primary care giver to them so this  doesn't identify as strongly for her.  Risk is currently mitigated by observation status.         Does the patient have thoughts of harming others? No     Is the patient engaging in sexually inappropriate behavior?  no        Current Substance Abuse     Is there recent substance abuse? THC, ETOH daily (hasn't drank in 10 days)     Was a urine drug screen or blood alcohol level obtained: No       Mental Status Exam     Affect: Blunted and Flat   Appearance: Appropriate    Attention Span/Concentration: Attentive  Eye Contact: Avoidant   Fund of Knowledge: Appropriate    Language /Speech Content: Fluent   Language /Speech Volume: Soft    Language /Speech Rate/Productions: Articulate    Recent Memory: Intact   Remote Memory: Intact   Mood: Depressed and Sad    Orientation to Person: Yes    Orientation to Place: Yes   Orientation to Time of Day: Yes    Orientation to Date: Yes    Situation (Do they understand why they are here?): Yes    Psychomotor Behavior: Normal    Thought Content: Suicidal   Thought Form: Intact      History of commitment: No         Medication    Psychotropic medications: No  Medication changes made in the last two weeks: No       Current Care Team    Primary Care Provider: No  Psychiatrist: No  Therapist: No  : No     CTSS or ARMHS: No  ACT Team: No  Other: No      Diagnosis    Other Unspecified and Specified Bipolar and Related Disorder 296.80 (F31.9) Unspecified Bipolar and Related Disorder   Hx of jason    Clinical Summary and Substantiation of Recommendations    Pt reports active suicidal ideation with planning and intent.  Pt does present as help seeking and is willing to engage in medication stabilization. A lower level of care has been unsuccessful in treating and stabilizing patient s mental health symptoms. Patient will remain on [facility] unit under observation for continued monitoring, treatment and therapeutic intervention of mental health symptoms. [facility]  help mitigate the need for a more restrictive level of care in an inpatient setting.     Disposition    Recommended disposition: Individual Therapy and Medication Management       Reviewed case and recommendations with attending provider. Attending Name: Determination in process, LM team will?update as disposition is finalized. See ED notes?for further information.       Attending concurs with disposition: Determination in process, LMHP team will?update as disposition is finalized. See ED notes?for further information.     Patient and/or validated legal guardian concurs with disposition: Yes       Final disposition: Other: observation.     Assessment Details    Patient interview started at: 2000 and completed at: 2030.     Total duration spent on the patient case in minutes: .50 hrs      CPT code(s) utilized: 67673 - Psychotherapy for Crisis - 60 (30-74*) min       Emmy Monreal [facility] Collaborative[facility]ALLIE, Psychotherapist  [facility] - Triage & Transition Services  Callback: [phone]

## 2023-03-16 NOTE — PROGRESS NOTES
Pt upset and tearful that she will be staying the night. She states it is not helpful for her to stay as her  and mother in law are from [facility] and they dont understand mental health and will not understand why she did not come home today. Pt states it is not helpful to her to be away from her kids. She acknowledged staying the night but states she does not want to take any medication and will not seek help in the future as she feels she was misled when seeking help. Pt states she is already having mental health issues and feels staff is messing with her mind. Pt was overheard yelling from her room and stated she felt like punching the wall. Pt declined PRN medication. She was able to calm down after speaking with her  and staff. Pt sitting on the bean bag in the sensory room reading a book.

## 2023-03-16 NOTE — DISCHARGE INSTRUCTIONS
Here is a free, no insurance therapy option!        Here are some other mental health options that offer free and reduced services    Rule 25 Chemical Health Assessments    [facility]  [phone] [address] [facility]  Hours: Mon-Fri, 8 am-2:30 pm (first come, first served)    [facility]  [phone] [address] [facility]  Walk-in: M-F, 7am-4pm (First come, first served or by appt)      Mental Health Care    [facility]  Suicidal: [phone] Consultation: [phone]  [address] [facility], 24/7 Crisis Intervention Center    Aftercare Plan    Follow up with established providers and supports as scheduled. Continue taking medications as prescribed. Abstain from drugs and alcohol. Utilize your [facility] access hospital mental health crisis team as needed. They are available 24/7. Contact information is listed below.     If I am feeling unsafe or I am in a crisis, I will:   Contact my established care providers   Call the National Suicide Prevention Lifeline: [phone]   Go to the nearest emergency room   Call 867     Warning signs that I or other people might notice when a crisis is developing for me: changes to sleep, appetite or mood, increased anger, agitation or irritability, feeling depressed or hopeless, spending more time alone or talking less, increased crying, decreased productivity, seeing or hearing things that aren't there, thoughts of not wanting to live anymore or of actually killing myself, thoughts of hurting others    Things I am able to do on my own to cope or help me feel better: watching a favorite tv show or movie, listening to music I enjoy, going outside and breathing fresh air, going for a walk or exercising, taking a shower or bath, a cold or hot beverage, a healthy snack, drawing/coloring/painting, journaling, singing or dancing, deep breathing     I can try practicing square breathing when I begin to feel anxious - inhale through the  nose for the count of 4 and the first line on the square. Exhale through the mouth for the count of 4 for the second line of the square. Repeat to complete the square. Repeat the square as many times as needed.    I can also use my five senses to practice mindfulness and grounding. What are five things I can see, four things I can hear, three things I can feel, two things I can smell, and one thing I can taste.     Things that I am able to do with others to cope or help me feel better: sometimes just talking or spending time with someone else, sharing a meal or having coffee, watching a movie or playing a game, going for a walk or exercising    I can also use community resources including mental health hotlines, [facility] crisis teams, or apps.     Things I can use or do for distraction: movies/tv, music, reading, games, drawing/coloring/painting or other art, essential oils, exercise, cleaning/organizing, puzzles, crossword puzzles, word search, Sudoku       I can also download a meditation or relaxation tuan, like Calm, Headspace, or Insight Timer (all three offer a free version)    A great website resource is Change to Chill with active coping skills    Changes I can make to support my mental health and wellness: Attend scheduled mental health therapy and psychiatric appointments. Take my medications as prescribed. Maintain a daily schedule/routine. Abstain from all mood altering substances, including drugs, alcohol, or medications not currently prescribed to me. Implement a self-care routine, asking my employer for accomodation     People in my life that I can ask for help: friends or family, mental health crisis lines, or 911    Your [facility] has a mental health crisis team you can call 24/7: [facility], [phone]    Other things that are important when I m in crisis: to remember that the feelings I am having right now are temporary, and it won't feel like this forever, and that it is okay and important  "to ask for help, my children are important to me    Crisis Lines  Crisis Text Line  Text 164118  You will be connected with a trained live crisis counselor to provide support.    Por danilo, bentleyo  NAHUN a 928645 o texto a 442-AYUDAME en WhatsApp    National Hope Line  1.800.SUICIDE [8140975]      Community Resources  Fast Tracker  Linking people to mental health and substance use disorder resources  HazelTreen.CrossFiber     [place] Mental Health Warm Line  Peer to peer support  Monday thru Saturday, 12 pm to 10 pm  934.121.0104 or 6.534.126.1910  Text \"Support\" to 81041    National [place] on Mental Illness (LOIS)  314.279.4719 or 1.888.LOIS.HELPS      Mental Health Apps  My3  https://myVeran Medical Technologiespp.org/    VirtualHopeBox  https://Hotelcloud/apps/virtual-hope-box/      Additional Information  Today you were seen by a licensed mental health professional through Triage and Transition services, [facility] ([facility])  for a crisis assessment in the Emergency Department at [facility].  It is recommended that you follow up with your established providers (psychiatrist, mental health therapist, and/or primary care doctor - as relevant) as soon as possible. Coordinators from [facility] will be calling you in the next 24-48 hours to ensure that you have the resources you need.  You can also contact [facility] coordinators directly at [phone]. You may have been scheduled for or offered an appointment with a mental health provider. [facility] maintains an extensive network of licensed behavioral health providers to connect patients with the services they need.  We do not charge providers a fee to participate in our referral network.  We match patients with providers based on a patient's specific needs, insurance coverage, and location.  Our first effort will be to refer you to a provider within your care system, and will utilize providers outside your care system as needed.          "

## 2023-03-16 NOTE — ED PROVIDER NOTES
"[facility] Unit - Psychiatric Observation Discharge Summary  [facility] Emergency Department  Discharge Date: 3/16/2023    Jazlyn Ch MRN: [id number]   Age: 27 year old YOB: 1995     Brief HPI & Initial ED Course     Chief Complaint   Patient presents with     Depression     Suicidal   Telemedicine Visit: The patient's condition can be safely assessed and treated via synchronous audio and visual telemedicine encounter.      Reason for Telemedicine Visit: Services only offered telehealth      Originating Site (Patient Location): [facility] emergency department unit    Distant Site (Provider Location): Provider Remote Setting    Consent:  The patient/guardian has verbally consented to: the potential risks and benefits of telemedicine (video visit or phone) versus in person care; bill my insurance or make self-payment for services provided; and responsibility for payment of non-covered services.     Mode of Communication: [employer] Gaurav, a secure HIPAA compliant video platform     HPI  Jazlyn Ch is a 27 year old female with history notable for possible manic behavior 4 years ago, possibly substance use, depression and history of alcohol use disorder and cannabis use disorder who presents to the ED with worsening symptoms of depression with suicidal ideation. She was medically cleared and transferred to [facility] where she was psychiatrically assessed by FILIPPO Murdock, who placed patient on observation status while starting patient on Fluoxetine 20 mg daily to target depressive symptoms, trazodone for sleep and atarax for anxiety.  No acute issues over night.     Patient is seen for follow up of depression and suicidal thoughts. States she was feeling \"stuck\" on admission. Recognizes the need to change her use of chemicals, most notably alcohol and cannabis use. Feels her chemical use stems from untreated depression and anxiety. Stating she stopped using both about a week ago with notable " "increase in anxiety. Motivated to stop on her own and not seeking CD treatment.  Patient describes her mood \"fine\" and feels overall less depressed than on admission as she feels optimistic about getting help.  Patient is tolerating their medications without adverse side effects.   Describes sleeping well overnight with trazodone.  Reports appetite as good. Feels hungry, wanting to change diet to vegan.   No symptoms of psychosis. Denies current suicidal thoughts and feels safe to return home today.    Physical Examination   BP: 113/84  Pulse: 72  Temp: 98.2  F (36.8  C)  Resp: 16  Height: 167.6 cm (5' 6\")  Weight: 98.7 kg (217 lb 11.2 oz)  SpO2: 99 %    Physical Exam  General: Appears stated age.   Neuro: Alert and fully oriented. Extremities appear to demonstrate normal strength on visual inspection.   Integumentary/Skin: no rash visualized, normal color    Psychiatric Examination   Appearance: awake, alert  Attitude:  cooperative  Eye Contact:  good  Mood:  better  Affect:  intensity is normal  Speech:  clear, coherent  Psychomotor Behavior:  no evidence of tardive dyskinesia, dystonia, or tics  Thought Process:  logical, linear and goal oriented  Associations:  no loose associations  Thought Content:  no evidence of suicidal ideation or homicidal ideation and no evidence of psychotic thought  Insight:  good  Judgement:  intact  Oriented to:  time, person, and place  Attention Span and Concentration:  intact  Recent and Remote Memory:  intact  Language: able to name/identify objects without impairment  Fund of Knowledge: intact with awareness of current and past events    Results        Labs Ordered and Resulted from Time of ED Arrival to Time of ED Departure   COVID-19 VIRUS (CORONAVIRUS) BY PCR - Normal       Result Value    SARS CoV2 PCR Negative         Observation Course   The patient was found to have a psychiatric condition that would benefit from an observation stay in the emergency department for further " psychiatric stabilization and/or coordination of a safe disposition. The plan upon observation admission included serial assessments of psychiatric condition, potential administration of medications if indicated, further disposition pending the patient's psychiatric course during the monitoring period.     Serial assessments of the patient's psychiatric condition were performed. Nursing notes were reviewed. During the observation period, the patient did not require medications for agitation, and did not require restraints/seclusion for patient and/or provider safety.     After a period of working with the treatment team on the Em[facility] unit, the patient's mental state improved to allow a safe transition to outpatient care. After counseling on the diagnosis, work-up, and treatment plan, the patient was discharged. Close follow-up with a psychiatrist and/or therapist was recommended and community psychiatric resources were provided. Patient is to return to the ED if any urgent or potentially life-threatening concerns.     Discharge Diagnoses:   Final diagnoses:   Suicidal thoughts   Severe episode of recurrent major depressive disorder, without psychotic features (H)       Treatment Plan:  -discharge home with safety plan in place.  -Filled discharge medications at hospital pharmacy to ensure patient able to get them due to insurance barriers.  -Prozac 20 mg daily, atarax 25-50 mg every 6 hours as needed for anxiety. Trazodone 50 mg nightly for sleep as needed.  -Medication education provided this visit includes, rationale for medication, importance of compliance, medication interactions, and common side effects. Patient agreeable.  -unable to set up aftercare appointments as patient has no medical insurance, however free psychiatric care options provided.    At the time of discharge, the patient's acute suicide risk was determined to be low due to the following factors: Reduction in the intensity of mood/anxiety  symptoms that preceded the admission, denial of suicidal thoughts, denies feeling helpless or helpless, not currently under the influence of alcohol or illicit substances, denies experiencing command hallucinations, no immediate access to firearms. The patient's acute risk could be higher if noncompliant with their treatment plan, medications, follow-up appointments or using illicit substances or alcohol. Protective factors include: social supports, children, stable housing, employment.    I spent more than 30 minutes on discharge day activities.    --  FILIPPO Pal CNP  [facility] EMERGENCY DEPT  Em[facility] Unit  3/16/2023     ISkylar APRN, DELONTE have personally performed an examination of this patient.  I have edited the note to reflect all relevant changes.  I have discussed this patient with the care team on 3/16/23.  I have reviewed all vitals and laboratory findings.        Skylar Marcelo APRN CNP  03/16/23 [id number]

## 2023-03-16 NOTE — ED PROVIDER NOTES
[facility] Unit - Initial Psychiatric Observation Note  [facility] Emergency Department  Observation Initiation Date: Mar 15, 2023    Jazlyn Ch MRN: [id number]   Age: 27 year old YOB: 1995     History     Chief Complaint   Patient presents with     Depression     Suicidal     HPI  Jazlyn Ch is a 27 year old female with a past history notable for depression and a brief episode of psychosis in 2019 stemming from acute stress related to the loss of her mother and stepfather within a short span of time. Pt with history of cannabis and alcohol use. She presented to the emergency department today for evaluation of worsening depression and suicidal thinking. She was medically evaluated in the emergency department and found to be cleared for transfer to [facility] for further psychiatric assessment. Here at [facility], patient reports that she came to the ED today because she was experiencing intrusive suicidal thoughts to crash her car on her way home from work. 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 This has been quite stressful at times. In addition, she is working 6 days per week, is in mandatory overtime, and some weeks works upwards of 68 hours per week. Sunday is her only day off and she is generally quite drained. She has been experiencing worsening mood, decreased energy, feelings of hopelessness. She feels extremely overwhelmed. Recently, she has been questioning whether she is paranoid or whether she is being gaslit by her employees. She states that she will communicate things to her employees and then later they will tell her that she never told them that or that she is not communicating things to them. She at times feels there is no way out. She has contemplated CO poisoning. She is unclear about whether she is able to talk with her  about how she is feeling. She  "initially wanted to leave this evening after our meeting but we discussed that this likely would not be a safe option at this time. She is open and agreeable to medications. She recalls the event in 2019 after the loss of her mother and stepfather. She reports that things have not been the same from that time and she has struggled with depressive symptoms since 2019. She denies any symptoms of jason since that 2019 episode, where she had gone several nights with no sleep. No homicidal ideation or hallucinations.       Past Medical History  Past Medical History:   Diagnosis Date     Chlamydia Nov 2014     Chronic kidney disease     kidney stone 2011     Depressive disorder     during previous pregnancy/ situational, postpartum it was diagnosed with second baby.     Gonorrhea  Nov 2014     Past Surgical History:   Procedure Laterality Date     NO HISTORY OF SURGERY       dextromethorphan (DELSYM) 30 MG/5ML liquid  EPINEPHrine (ANY BX GENERIC EQUIV) 0.3 MG/0.3ML injection 2-pack  predniSONE (DELTASONE) 20 MG tablet      Allergies   Allergen Reactions     Banana      anaphyaxis       Kiwi      Seasonal Allergies      Family History  Family History   Problem Relation Age of Onset     Diabetes Maternal Grandmother         Type II doabetis     Social History   Social History     Tobacco Use     Smoking status: Never     Smokeless tobacco: Never   Substance Use Topics     Alcohol use: Not Currently     Drug use: Not Currently     Types: Marijuana     Comment: Pt. had smoked marijuna occassionally in the past, stopped when she found out she was pregnant          Review of Systems  A medically appropriate review of systems was performed with pertinent positives and negatives noted in the HPI, and all other systems negative.    Physical Examination   BP: (!) 142/80  Pulse: 65  Temp: 97.1  F (36.2  C)  Resp: 18  Height: 167.6 cm (5' 6\")  Weight: 98.7 kg (217 lb 11.2 oz)  SpO2: 100 %    Physical Exam  General: Appears stated " age.   Neuro: Alert and fully oriented. Extremities appear to demonstrate normal strength on visual inspection.   Integumentary/Skin: no rash visualized, normal color    Psychiatric Examination   Appearance: awake, alert, adequately groomed, dressed in hospital scrubs and appeared as age stated  Attitude:  cooperative  Eye Contact:  fair  Mood:  sad  and depressed  Affect:  mood congruent, intensity is blunted and reactive  Speech:  clear, coherent and normal prosody  Psychomotor Behavior:  no evidence of tardive dyskinesia, dystonia, or tics  Thought Process:  linear and goal oriented  Associations:  no loose associations  Thought Content:  no evidence of suicidal ideation or homicidal ideation and no evidence of psychotic thought  Insight:  fair  Judgement:  fair  Oriented to:  time, person, and place  Attention Span and Concentration:  intact  Recent and Remote Memory:  intact  Language: able to name/identify objects without impairment  Fund of Knowledge: intact with awareness of current and past events    ED Course        Labs Ordered and Resulted from Time of ED Arrival to Time of ED Departure   COVID-19 VIRUS (CORONAVIRUS) BY PCR - Normal       Result Value    SARS CoV2 PCR Negative         Assessments & Plan (with Medical Decision Making)   Patient presenting with suicidal ideation in the context of worsening depressive symptoms complicated by work-related stress and working upwards to 70 hours per week. Nursing notes reviewed noting no acute issues.     I have reviewed the assessment completed by the [facility].     During the observation period, the patient did not require medications for agitation, and did not require restraints/seclusion for patient and/or provider safety.     The patient was found to have a psychiatric condition that would benefit from an observation stay in the emergency department for further psychiatric stabilization and/or coordination of a safe disposition. The observation plan includes  serial assessments of psychiatric condition, potential administration of medications if indicated, further disposition pending the patient's psychiatric course during the monitoring period.     Preliminary diagnosis:    ICD-10-CM    1. Suicidal thoughts  R45.851       2. Severe episode of recurrent major depressive disorder, without psychotic features (H)  F33.2    R/o features of psychosis        Treatment Plan:  - Initiate fluoxetine 20 mg daily to target depressive symptoms  - Offer trazodone 50 mg at bedtime PRN, may repeat x1  - Hydroxyzine 25 to 50 mg q6h prn for anxiety  - At this time, patient appears to meet criteria for an involuntary hold. She had been contemplating crashing her vehicle on her way to the ED, has been thinking about CO poisoning. She is severely depressed and has no outpatient services in place. If demanding to leave overnight, would recommend to place a 72 hour hold.   - Pt unfortunately does not have active insurance due to missing open enrollment at work. This may complicate follow-up.   - Reassess tomorrow.    --  Coleman Moura CNP   [facility] EMERGENCY DEPT  EmPATH Unit  3/15/2023        Coleman Moura CNP  03/15/23 [id number]

## 2023-03-16 NOTE — ED NOTES
Patient calm and pleasant, requesting to talk to the provider the soonest. She is looking forward to discharge today. Affect is flat, appears to be sad but she feels safe to be discharge and go home today.

## 2023-03-16 NOTE — PROGRESS NOTES
After the repeated dose of trazodone, pt was able to fall asleep, and has been sleeping through the NOC.

## 2023-03-16 NOTE — ED NOTES
Patient agreed to the  discharge plan. Discharge instructions reviewed with patient including follow-up care plan. Medications discussed. Reviewed safety plan and outpatient resources. Denies SI and HI. No signs of hallucination. All belongings that were brought into the hospital have been returned to patient. Escorted off the unit at door no. 6 accompanied by [facility] staff. Discharged to home with resources and medication via self drive.
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
## 2023-03-16 NOTE — PLAN OF CARE
"Jazlyn Ch  March 15, 2023  Plan of Care Hand-off Note     Patient Care Path: Observation    Plan for Care:     Pt reports that she has been feeling increasingly depressed and suicidal.  She reported to the RN today that she had to miss her son's ASD evaluation due to working which only increased her ideation.  Pt states that she had thoughts of crashing her car or CO poisoning but decided to come to the ED to see if there were any \"options left\".  Pt denies any previous attempts, self harm or homicidal ideation.  Pt reports active intent.  Pt states that she is torn between leaving her children, but just states she feels like she needs to die or \"start over in a new brain\".     Pt reports that over the last 6 months she has been feeling increasingly overwhelmed and depressed.  Pt states that work has been overwhelming and she feels like she is having a rudolph in her brain of constantly trying to figure out if she is paranoid or if she is being gaslite at work.   Pt states that in combination with working overtime she doesn't have any time left to spend with her family or children.  Pt denies that she feels supported.     Pt denies any psychosis, jason, or delusional thinking.  Pt presents as alert, oriented, calm and cooperative.  Pt is tearful, flat, and makes minimal eye contact.    Critical Safety Issues: suicidal    Overview:  This patient is a child/adolescent: No    This patient has additional special visitor precautions: No    Legal Status: Voluntary, but holdable due to suicidal ideation    Contacts:   n/a    Psychiatry Consult:  Psychiatry Consult not requested because empath pt    Updated Consulting Psychiatric Provider regarding plan of care.    Emmy Monreal [facility]      "

## 2023-03-16 NOTE — ED NOTES
"The following information was received from [facility] whose relationship to the patient is . Information was obtained via phone. Their phone number is [phone] and they last had contact with patient on today.    What happened today: see below    What is different about patient's functioning: \"I know that she's been going through some work related challenges. This is her first experience as a  and she has high expectations of herself. She has been complaining about that lately.\"     Her mood has been down, seems to be related to issues at work.     Concern about alcohol/drug use: She drinks alcohol every once in a while. No concerns.     What do you think the patient needs: \"Maybe finding another job might help. Or moving to a different work shift. I wish someone could talk to her supervisor.\"    Has patient made comments about wanting to kill themselves/others:  \"Not to me. I don't think that is something she would do, but I don't underestimate the emotional challenges she's been going through. I know she's having a lot of emotional issues.\"    If d/c is recommended, can they take part in safety/aftercare planning: Yes willing to support as able    TOM Guevara      "

## 2023-03-16 NOTE — ED NOTES
"[facility] Crisis Reassessment      Jazlyn Ch was reassessed due to boarding status on [facility]. Pt was first seen on 3/15 by Writer; see the initial assessment note for details.      Patient Presentation    Initial ED presentation details: Pt reports that she has been feeling increasingly depressed and suicidal.  She reported to the RN today that she had to miss her son's ASD evaluation due to working which only increased her ideation.  Pt states that she had thoughts of crashing her car or CO poisoning but decided to come to the ED to see if there were any \"options left\".  Pt denies any previous attempts, self harm or homicidal ideation.  Pt reports active intent.  Pt states that she is torn between leaving her children, but just states she feels like she needs to die or \"start over in a new brain\".     Pt reports that over the last 6 months she has been feeling increasingly overwhelmed and depressed.  Pt states that work has been overwhelming and she feels like she is having a rudolph in her brain of constantly trying to figure out if she is paranoid or if she is being gaslite at work.   Pt states that in combination with working overtime she doesn't have any time left to spend with her family or children.  Pt denies that she feels supported.     Pt denies any psychosis, jason, or delusional thinking.  Pt presents as alert, oriented, calm and cooperative.  Pt is tearful, flat, and makes minimal eye contact.    Current patient presentation: Pt reports that she was able to take some medications to help her sleep last night.  Pt reports a slight improvement.  Pt states that she additionally started mood/anxiety medication today of which she is hopeful for.  Pt desires to discharge home to be with her children and family.  Pt states that she has been in contact with her family and her employer for support.  Pt reports on going anxiety and depression sx that will have to be managed on going.  Pt however, denies any " current safety concerns including suicidal ideation, intent, or planning.  Pt denies any self harm or homicidal ideation.  Pt reports that resting was helpful for herself.  Pt notes that she is hopeful that she can make some changes at work to improve her relationships and support needed.  Pt presents as future and goal oriented.  Pt is help seeking and brought herself to the ED she is confident she can do that again if she sees no improvement.  Pt does not present as psychotic, manic, delusional or paranoid.  Pt con't to be tearful and overwhelmed, but declines to stay another day on observation status.    Changes observed since initial assessment: Improvement      Risk of Harm    [place] Suicide Severity Rating Scale Full Clinical Version:3/15  Suicidal Ideation  1. Wish to be Dead (Lifetime): Yes  1. Wish to be Dead (Past 1 Month): Yes  2. Non-Specific Active Suicidal Thoughts (Lifetime): Yes  2. Non-Specific Active Suicidal Thoughts (Past 1 Month): Yes  3. Active Suicidal Ideation with any Methods (Not Plan) Without Intent to Act (Lifetime): Yes  3. Active Suicidal Ideation with any Methods (Not Plan) Without Intent to Act (Past 1 Month): Yes  4. Active Suicidal Ideation with Some Intent to Act, Without Specific Plan (Lifetime): Yes  4. Active Suicidal Ideation with Some Intent to Act, Without Specific Plan (Past 1 Month): Yes  5. Active Suicidal Ideation with Specific Plan and Intent (Lifetime): Yes  5. Active Suicidal Ideation with Specific Plan and Intent (Past 1 Month): Yes  Intensity of Ideation  Most Severe Ideation Rating (Lifetime): 4  Most Severe Ideation Rating (Past 1 Month): 4  Frequency (Lifetime): Many times each day  Frequency (Past 1 Month): Many times each day  Duration (Lifetime): 4-8 hours/most of day  Duration (Past 1 Month): 4-8 hours/most of day  Controllability (Lifetime): Can control thoughts with a lot of difficulty  Controllability (Past 1 Month): Can control thoughts with a lot of  difficulty  Deterrents (Lifetime): Deterrents probably stopped you  Deterrents (Past 1 Month): Deterrents probably stopped you  Reasons for Ideation (Lifetime): Completely to end or stop the pain (You couldn't go on living with the pain or how you were feeling)  Reasons for Ideation (Past 1 Month): Completely to end or stop the pain (You couldn't go on living with the pain or how you were feeling)  Suicidal Behavior  Actual Attempt (Lifetime): No  Has subject engaged in non-suicidal self-injurious behavior? (Lifetime): No  Interrupted Attempts (Lifetime): No  Aborted or Self-Interrupted Attempt (Lifetime): No  Preparatory Acts or Behavior (Lifetime): No  C-SSRS Risk (Lifetime/Recent)  Calculated C-SSRS Risk Score (Lifetime/Recent): High Risk    Freestone Suicide Severity Rating Scale Since Last Contact: 3/16  Suicidal Ideation (Since Last Contact)  1. Wish to be Dead (Since Last Contact): No  2. Non-Specific Active Suicidal Thoughts (Since Last Contact): No  Suicidal Behavior (Since Last Contact)  Actual Attempt (Since Last Contact): No  Has subject engaged in non-suicidal self-injurious behavior? (Since Last Contact): No  Interrupted Attempts (Since Last Contact): No  Aborted or Self-Interrupted Attempt (Since Last Contact): No  Preparatory Acts or Behavior (Since Last Contact): No  Suicide (Since Last Contact): No     C-SSRS Risk (Since Last Contact)  Calculated C-SSRS Risk Score (Since Last Contact): No Risk Indicated    Validity of evaluation is not impacted by presenting factors during interview.   Comments regarding subjective versus objective responses to Freestone tool: n/a  Environmental or Psychosocial Events: work or task failure, challenging interpersonal relationships, barriers to accessing healthcare, helplessness/hopelessness and ongoing abuse of substances  Chronic Risk Factors: history of psychiatric hospitalization and chronic and ongoing sleep difficulties   Warning Signs: talking or writing about  death, dying, or suicide, hopelessness, feeling trapped, like there is no way out, increasing substance use or abuse, withdrawing from friends, family, and society and no reason for living, no sense of purpose in life  Protective Factors: strong bond to family unit, community support, or employment, intact marriage or domestic partnership, responsibilities and duties to others, including pets and children, lives in a responsibly safe and stable environment, sense of importance of health and wellness, help seeking, good problem-solving, coping, and conflict resolution skills, cultural, spiritual , or Moravian beliefs associated with meaning and value in life and optimistic outlook - identification of future goals  Interpretation of Risk Scoring, Risk Mitigation Interventions and Safety Plan:  Pt identifies no current suicidal ideation, intent, or planning. Pt denies any self harm or homicidal ideation.  Pt presents as help seeking and desires outpatient support and follow up including medication use.  Pt desires to return home to be with her children,  and mother in law.  Pt has been already discussing with her employer and HR what her options are.    Does the patient have thoughts of harming others? No    Mental Status Exam   Affect: Appropriate and Flat   Appearance: Appropriate    Attention Span/Concentration: Attentive?    Eye Contact: Avoidant   Fund of Knowledge: Appropriate    Language /Speech Content: Fluent   Language /Speech Volume: Soft    Language /Speech Rate/Productions: Normal    Recent Memory: Intact   Remote Memory: Intact   Mood: Depressed    Orientation to Person: Yes    Orientation to Place: Yes   Orientation to Time of Day: Yes    Orientation to Date: Yes    Situation (Do they understand why they are here?): Yes    Psychomotor Behavior: Normal    Thought Content: Clear   Thought Form: Intact       Additional Collateral Information   n/a      Therapeutic Intervention  The following  therapeutic methodologies were employed when working with the patient: Active listening, Assess dimensions of crisis, Establish a discharge plan and Motivational Interviewing. Patient response to intervention: engaged.    Diagnosis:   Other Unspecified and Specified Bipolar and Related Disorder 296.80 (F31.9) Unspecified Bipolar and Related Disorder   Hx of jason    Clinical Substantiation of Recommendations  PT denies any current suicidal ideation, intent or planning.  PT denies any self harm. Pt denies any current homicidal ideation, intent or planning.  PT is able to engage in safety planning. Pt appears future and goal oriented.  PT is able to engage in a discussion about their protective factors.  PT does not currently appear to be in need of acute stabilization for overt psychosis, jason, delusional thinking or paranoia.  PT is appropriate to transition into outpatient community services at this time.  Pt was offered another night of observation status of which she declined.    Pt presents as help seeking and desires outpatient support and follow up including medication use.  Pt desires to return home to be with her children,  and mother in law.  Pt has been already discussing with her employer and HR what her options are.    At the time of discharge, the patient's acute suicide risk was determined to be low due to the following factors: Reduction in the intensity of mood/anxiety symptoms that preceded the admission, denial of suicidal thoughts, denies feeling helpless or helpless, not currently under the influence of alcohol or illicit substances, denies experiencing command hallucinations, no immediate access to firearms. The patient's acute risk could be higher if noncompliant with their treatment plan, medications, follow-up appointments or using illicit substances or alcohol. Protective factors include: social supports, stable housing      Plan:    Disposition  Recommended disposition: Individual  Therapy and Medication Management      Reviewed case and recommendations with attending provider. Attending Name: Roro      Attending concurs with disposition: Yes      Patient and/or verified legal guardian concurs with disposition: Yes      Final disposition: Individual therapy  and Medication management.         Assessment Details  Total duration spent on the patient case in minutes: .50 hrs     CPT code(s) utilized: 41755 - Psychotherapy (with patient) - 30 (16-37*) min       Emmy Monreal, [facility], [facility]  Callback: [phone]        Here is a free, no insurance therapy option!        Here are some other mental health options that offer free and reduced services    Rule 25 Chemical Health Assessments    [facility]  [phone] [address] [facility]  Hours: Mon-Fri, 8 am-2:30 pm (first come, first served)    [facility]  [phone] [address] [facility]  Walk-in: M-F, 7am-4pm (First come, first served or by appt)      Mental Health Care    [facility] ([facility])  Suicidal: [phone] Consultation: [phone]  [address] [place] [address], 24/7 Crisis Intervention Center    Aftercare Plan    Follow up with established providers and supports as scheduled. Continue taking medications as prescribed. Abstain from drugs and alcohol. Utilize your Critical access hospital mental [facility] crisis team as needed. They are available 24/7. Contact information is listed below.     If I am feeling unsafe or I am in a crisis, I will:   Contact my established care providers   Call the National Suicide Prevention Lifeline: [phone]   Go to the nearest emergency room   Call 001     Warning signs that I or other people might notice when a crisis is developing for me: changes to sleep, appetite or mood, increased anger, agitation or irritability, feeling depressed or hopeless, spending more time alone or talking less, increased crying, decreased productivity, seeing or hearing things that aren't  there, thoughts of not wanting to live anymore or of actually killing myself, thoughts of hurting others    Things I am able to do on my own to cope or help me feel better: watching a favorite tv show or movie, listening to music I enjoy, going outside and breathing fresh air, going for a walk or exercising, taking a shower or bath, a cold or hot beverage, a healthy snack, drawing/coloring/painting, journaling, singing or dancing, deep breathing     I can try practicing square breathing when I begin to feel anxious - inhale through the nose for the count of 4 and the first line on the square. Exhale through the mouth for the count of 4 for the second line of the square. Repeat to complete the square. Repeat the square as many times as needed.    I can also use my five senses to practice mindfulness and grounding. What are five things I can see, four things I can hear, three things I can feel, two things I can smell, and one thing I can taste.     Things that I am able to do with others to cope or help me feel better: sometimes just talking or spending time with someone else, sharing a meal or having coffee, watching a movie or playing a game, going for a walk or exercising    I can also use community resources including mental health hotlines, [facility] crisis teams, or apps.     Things I can use or do for distraction: movies/tv, music, reading, games, drawing/coloring/painting or other art, essential oils, exercise, cleaning/organizing, puzzles, crossword puzzles, word search, Sudoku       I can also download a meditation or relaxation tuan, like Calm, Headspace, or Insight Timer (all three offer a free version)    A great website resource is Change to Chill with active coping skills    Changes I can make to support my mental health and wellness: Attend scheduled mental health therapy and psychiatric appointments. Take my medications as prescribed. Maintain a daily schedule/routine. Abstain from all mood altering  "substances, including drugs, alcohol, or medications not currently prescribed to me. Implement a self-care routine, asking my employer for accomodation     People in my life that I can ask for help: friends or family, mental health crisis lines, or 911    Your [facility] has a mental health crisis team you can call 24/7: [facility], [phone]    Other things that are important when I m in crisis: to remember that the feelings I am having right now are temporary, and it won't feel like this forever, and that it is okay and important to ask for help, my children are important to me    Crisis Lines  Crisis Text Line  Text 874486  You will be connected with a trained live crisis counselor to provide support.    Por espanol, texto  NAHUN a 838084 o texto a 442-AYUDAME en WhatsApp    National Hope Line  1.800.SUICIDE [4482559]      Community Resources  Fast Tracker  Linking people to mental health and substance use disorder resources  fasttrackSteek SAn.org     [place] Mental Health Warm Line  Peer to peer support  Monday thru Saturday, 12 pm to 10 pm  [phone] or 5.752.262.7038  Text \"Support\" to 32975    National [place] on Mental Illness (LOIS)  790.395.0189 or 1.888.LOIS.HELPS      Mental Health Apps  My3  https://my3app.org/    VirtualHopeBox  https://Shuttlerock.org/apps/virtual-hope-box/      Additional Information  Today you were seen by a licensed mental health professional through Triage and Transition services, [facility] (P)  for a crisis assessment in the Emergency Department at [facility].  It is recommended that you follow up with your established providers (psychiatrist, mental health therapist, and/or primary care doctor - as relevant) as soon as possible. Coordinators from [facility] will be calling you in the next 24-48 hours to ensure that you have the resources you need.  You can also contact [facility] coordinators directly at [phone]. You may have been " scheduled for or offered an appointment with a mental health provider. [facility] maintains an extensive network of licensed behavioral health providers to connect patients with the services they need.  We do not charge providers a fee to participate in our referral network.  We match patients with providers based on a patient's specific needs, insurance coverage, and location.  Our first effort will be to refer you to a provider within your care system, and will utilize providers outside your care system as needed.

## 2023-03-16 NOTE — ED NOTES
Pt inquired about the discharge process. Pt tearful during conversation with staff. Reassurance and encouragement provided. Pt requested and took PRN Trazodone.

## 2023-06-03 ENCOUNTER — HEALTH MAINTENANCE LETTER (OUTPATIENT)
Age: 28
End: 2023-06-03

## 2024-07-07 ENCOUNTER — HEALTH MAINTENANCE LETTER (OUTPATIENT)
Age: 29
End: 2024-07-07

## 2025-07-13 ENCOUNTER — HEALTH MAINTENANCE LETTER (OUTPATIENT)
Age: 30
End: 2025-07-13